# Patient Record
Sex: MALE | Race: WHITE | NOT HISPANIC OR LATINO | Employment: FULL TIME | URBAN - METROPOLITAN AREA
[De-identification: names, ages, dates, MRNs, and addresses within clinical notes are randomized per-mention and may not be internally consistent; named-entity substitution may affect disease eponyms.]

---

## 2017-04-18 ENCOUNTER — RESULTS ENCOUNTER (OUTPATIENT)
Dept: ENDOCRINOLOGY | Age: 47
End: 2017-04-18

## 2017-04-18 DIAGNOSIS — I10 ESSENTIAL HYPERTENSION: ICD-10-CM

## 2017-04-18 DIAGNOSIS — E78.2 MIXED HYPERLIPIDEMIA: ICD-10-CM

## 2017-04-18 DIAGNOSIS — IMO0001 UNCONTROLLED TYPE 2 DIABETES MELLITUS WITHOUT COMPLICATION, WITHOUT LONG-TERM CURRENT USE OF INSULIN: ICD-10-CM

## 2017-04-26 ENCOUNTER — OFFICE VISIT (OUTPATIENT)
Dept: ENDOCRINOLOGY | Age: 47
End: 2017-04-26

## 2017-04-26 VITALS
HEIGHT: 76 IN | DIASTOLIC BLOOD PRESSURE: 78 MMHG | SYSTOLIC BLOOD PRESSURE: 120 MMHG | WEIGHT: 246.8 LBS | BODY MASS INDEX: 30.05 KG/M2

## 2017-04-26 DIAGNOSIS — E34.9 TESTOSTERONE INSUFFICIENCY: ICD-10-CM

## 2017-04-26 DIAGNOSIS — IMO0001 UNCONTROLLED TYPE 2 DIABETES MELLITUS WITHOUT COMPLICATION, WITHOUT LONG-TERM CURRENT USE OF INSULIN: ICD-10-CM

## 2017-04-26 DIAGNOSIS — G62.9 PERIPHERAL POLYNEUROPATHY: ICD-10-CM

## 2017-04-26 DIAGNOSIS — E11.42 DIABETIC PERIPHERAL NEUROPATHY (HCC): ICD-10-CM

## 2017-04-26 DIAGNOSIS — E78.2 MIXED HYPERLIPIDEMIA: ICD-10-CM

## 2017-04-26 DIAGNOSIS — I10 ESSENTIAL HYPERTENSION: ICD-10-CM

## 2017-04-26 DIAGNOSIS — IMO0001 UNCONTROLLED TYPE 2 DIABETES MELLITUS WITHOUT COMPLICATION, WITHOUT LONG-TERM CURRENT USE OF INSULIN: Primary | ICD-10-CM

## 2017-04-26 PROCEDURE — 99214 OFFICE O/P EST MOD 30 MIN: CPT | Performed by: NURSE PRACTITIONER

## 2017-04-26 RX ORDER — OMEPRAZOLE 40 MG/1
CAPSULE, DELAYED RELEASE ORAL
Qty: 90 CAPSULE | Refills: 5 | Status: SHIPPED | OUTPATIENT
Start: 2017-04-26 | End: 2018-04-17 | Stop reason: SDUPTHER

## 2017-04-26 RX ORDER — PRAVASTATIN SODIUM 40 MG
40 TABLET ORAL DAILY
Qty: 90 TABLET | Refills: 5 | Status: SHIPPED | OUTPATIENT
Start: 2017-04-26 | End: 2018-04-17 | Stop reason: SDUPTHER

## 2017-04-26 RX ORDER — GLIMEPIRIDE 4 MG/1
4 TABLET ORAL
Qty: 90 TABLET | Refills: 5 | Status: SHIPPED | OUTPATIENT
Start: 2017-04-26 | End: 2018-04-17 | Stop reason: SDUPTHER

## 2017-04-26 RX ORDER — LISINOPRIL 10 MG/1
TABLET ORAL
Qty: 90 TABLET | Refills: 5 | Status: SHIPPED | OUTPATIENT
Start: 2017-04-26 | End: 2018-04-17 | Stop reason: SDUPTHER

## 2017-04-26 NOTE — PROGRESS NOTES
"Subjective   Robert Youssef is a 46 y.o. male is here today for follow-up.  Chief Complaint   Patient presents with   • Diabetes     no recent labs; didnt bring BG monitor; doesnt test BG   • Hyperlipidemia   • Hypertension   • diabetic peripheral neuropathy   • testosterone difficiency     /78  Ht 76\" (193 cm)  Wt 246 lb 12.8 oz (112 kg)  BMI 30.04 kg/m2  Current Outpatient Prescriptions on File Prior to Visit   Medication Sig   • glimepiride (AMARYL) 4 MG tablet Take 1 tablet by mouth Every Morning Before Breakfast.   • lisinopril (PRINIVIL,ZESTRIL) 10 MG tablet 1 tablet daily by mouth.   • omeprazole (PriLOSEC) 40 MG capsule TAKE ONE CAPSULE BY MOUTH DAILY   • pravastatin (PRAVACHOL) 40 MG tablet Take 1 tablet by mouth Daily.   • SITagliptin-MetFORMIN HCl ER (JANUMET XR)  MG tablet sustained-release 24 hour 1 TAB read twice a day with breakfast and supper     No current facility-administered medications on file prior to visit.      History reviewed. No pertinent family history.  Social History   Substance Use Topics   • Smoking status: Never Smoker   • Smokeless tobacco: None   • Alcohol use None         History of Present Illness  Encounter Diagnoses   Name Primary?   • Uncontrolled type 2 diabetes mellitus without complication, without long-term current use of insulin Yes   • Diabetic peripheral neuropathy    • Testosterone insufficiency    • Peripheral polyneuropathy    • Mixed hyperlipidemia    • Essential hypertension    This is a 46-year-old male patient here today for routine follow-up visit for the above-mentioned problems.  He is not had any recent labs done.  He states he is taking his medications as prescribed.  He has had no hypoglycemic events.  He is needing refills on his medications for 90 day [facility] pharmacy.  He states his energy level is good.  He was recently treated for Warner spotted fever as result of a tick bite.  He states he completed 14 days of doxycycline.  He " is not routinely checking his blood sugar levels.  I reviewed his labs from his last visit.  His hemoglobin A1c was in good range.      The following portions of the patient's history were reviewed and updated as appropriate: allergies, current medications, past family history, past medical history, past social history, past surgical history and problem list.    Review of Systems   Constitutional: Negative for fatigue.   HENT: Negative for trouble swallowing.    Eyes: Negative for visual disturbance.   Respiratory: Negative for shortness of breath.    Cardiovascular: Negative for leg swelling.   Gastrointestinal: Negative for nausea.   Endocrine: Negative for polyuria.   Genitourinary: Negative for urgency.   Musculoskeletal: Negative for joint swelling.   Skin: Negative for wound.   Allergic/Immunologic: Negative for immunocompromised state.   Neurological: Negative for numbness.   Hematological: Negative for adenopathy.   Psychiatric/Behavioral: The patient is not nervous/anxious.        Objective   Physical Exam   Constitutional: He is oriented to person, place, and time. He appears well-developed and well-nourished. No distress.   HENT:   Head: Normocephalic and atraumatic.   Right Ear: External ear normal.   Left Ear: External ear normal.   Nose: Nose normal.   Eyes: Pupils are equal, round, and reactive to light. Right eye exhibits no discharge. Left eye exhibits no discharge.   Neck: Normal range of motion. Neck supple. Carotid bruit is not present. No tracheal deviation, no edema and no erythema present. No thyromegaly present.   Cardiovascular: Normal rate, regular rhythm, normal heart sounds and intact distal pulses.  Exam reveals no gallop and no friction rub.    No murmur heard.  Pulmonary/Chest: Effort normal and breath sounds normal. No respiratory distress. He has no wheezes. He has no rales.   Abdominal: Soft. Bowel sounds are normal. He exhibits no distension. There is no tenderness.    Musculoskeletal: Normal range of motion. He exhibits no edema or deformity.   Lymphadenopathy:     He has no cervical adenopathy.   Neurological: He is alert and oriented to person, place, and time. Coordination normal.   Skin: Skin is warm and dry. No rash noted. He is not diaphoretic. No erythema. No pallor.   Psychiatric: He has a normal mood and affect. His behavior is normal. Judgment and thought content normal.   Nursing note and vitals reviewed.    Office Visit on 10/18/2016   Component Date Value Ref Range Status   • TSH 10/18/2016 2.020  0.270 - 4.200 mIU/mL Final   • T4, Total 10/18/2016 7.84  4.50 - 11.70 mcg/dL Final   • Uric Acid 10/18/2016 4.6  3.4 - 7.0 mg/dL Final   • 25 Hydroxy, Vitamin D 10/18/2016 47.3  30.0 - 100.0 ng/mL Final    Comment: Reference Range for Total Vitamin D 25(OH)  Deficiency Â  Â <20.0 ng/mL  Insufficiency 21-29 ng/mL  Sufficiency Â   ng/mL  Toxicity      >100 ng/ml     • Glucose 10/18/2016 146* 65 - 99 mg/dL Final   • BUN 10/18/2016 16  6 - 20 mg/dL Final   • Creatinine 10/18/2016 1.25  0.76 - 1.27 mg/dL Final   • eGFR Non  Am 10/18/2016 62  >60 mL/min/1.73 Final   • eGFR African Am 10/18/2016 76  >60 mL/min/1.73 Final   • BUN/Creatinine Ratio 10/18/2016 12.8  7.0 - 25.0 Final   • Sodium 10/18/2016 140  136 - 145 mmol/L Final   • Potassium 10/18/2016 4.6  3.5 - 5.2 mmol/L Final   • Chloride 10/18/2016 98  98 - 107 mmol/L Final   • Total CO2 10/18/2016 28.2  22.0 - 29.0 mmol/L Final   • Calcium 10/18/2016 9.4  8.6 - 10.5 mg/dL Final   • Total Protein 10/18/2016 7.3  6.0 - 8.5 g/dL Final   • Albumin 10/18/2016 4.90  3.50 - 5.20 g/dL Final   • Globulin 10/18/2016 2.4  gm/dL Final   • A/G Ratio 10/18/2016 2.0  g/dL Final   • Total Bilirubin 10/18/2016 0.4  0.1 - 1.2 mg/dL Final   • Alkaline Phosphatase 10/18/2016 39  39 - 117 U/L Final   • AST (SGOT) 10/18/2016 16  1 - 40 U/L Final   • ALT (SGPT) 10/18/2016 21  1 - 41 U/L Final   • C-Peptide 10/18/2016 3.7  1.1 - 4.4  ng/mL Final    C-Peptide reference interval is for fasting patients.   • Hemoglobin A1C 10/18/2016 6.80* 4.80 - 5.60 % Final    Comment: Hemoglobin A1C Ranges:  Increased Risk for Diabetes  5.7% to 6.4%  Diabetes                     >= 6.5%  Diabetic Goal                < 7.0%     • Total Cholesterol 10/18/2016 150  0 - 200 mg/dL Final   • Triglycerides 10/18/2016 138  0 - 150 mg/dL Final   • HDL Cholesterol 10/18/2016 48  40 - 60 mg/dL Final   • VLDL Cholesterol 10/18/2016 27.6  5 - 40 mg/dL Final   • LDL Cholesterol  10/18/2016 74  0 - 100 mg/dL Final         Assessment/Plan   Robert was seen today for diabetes, hyperlipidemia, hypertension, diabetic peripheral neuropathy and testosterone difficiency.    Diagnoses and all orders for this visit:    Uncontrolled type 2 diabetes mellitus without complication, without long-term current use of insulin    Diabetic peripheral neuropathy    Testosterone insufficiency    Peripheral polyneuropathy    Mixed hyperlipidemia    Essential hypertension        In summary, patient was seen and examined.  He will have extensive laboratory evaluation done at today's visit.  He'll be notified of the results along with any further recommendations.  He is to continue all his current medications as prescribed.  A prescription refills will be sent to his pharmacy for 90 days.  He is to follow-up with Dr. Goodman as scheduled or in 4 months.  BLADE Morgan was reviewed at today's visit.  Metabolically he is stable.

## 2017-04-28 LAB
25(OH)D3+25(OH)D2 SERPL-MCNC: 36.2 NG/ML (ref 30–100)
ALBUMIN SERPL-MCNC: 5.1 G/DL (ref 3.5–5.2)
ALBUMIN/GLOB SERPL: 2 G/DL
ALP SERPL-CCNC: 37 U/L (ref 39–117)
ALT SERPL-CCNC: 18 U/L (ref 1–41)
AST SERPL-CCNC: 19 U/L (ref 1–40)
BILIRUB SERPL-MCNC: 0.5 MG/DL (ref 0.1–1.2)
BUN SERPL-MCNC: 22 MG/DL (ref 6–20)
BUN/CREAT SERPL: 17.7 (ref 7–25)
C PEPTIDE SERPL-MCNC: 3.1 NG/ML (ref 1.1–4.4)
CALCIUM SERPL-MCNC: 9.9 MG/DL (ref 8.6–10.5)
CHLORIDE SERPL-SCNC: 98 MMOL/L (ref 98–107)
CHOLEST SERPL-MCNC: 152 MG/DL (ref 0–200)
CO2 SERPL-SCNC: 25.9 MMOL/L (ref 22–29)
CONV COMMENT: NORMAL
CREAT SERPL-MCNC: 1.24 MG/DL (ref 0.76–1.27)
FT4I SERPL CALC-MCNC: 1.9 (ref 1.2–4.9)
GLOBULIN SER CALC-MCNC: 2.6 GM/DL
GLUCOSE SERPL-MCNC: 105 MG/DL (ref 65–99)
HBA1C MFR BLD: 7.4 % (ref 4.8–5.6)
HCT VFR BLD AUTO: 44.4 % (ref 40.4–52.2)
HDLC SERPL-MCNC: 58 MG/DL (ref 40–60)
HGB BLD-MCNC: 14.5 G/DL (ref 13.7–17.6)
LDLC SERPL CALC-MCNC: 79 MG/DL (ref 0–100)
POTASSIUM SERPL-SCNC: 4.3 MMOL/L (ref 3.5–5.2)
PROT SERPL-MCNC: 7.7 G/DL (ref 6–8.5)
PSA SERPL-MCNC: 0.89 NG/ML (ref 0–4)
SHBG SERPL-SCNC: 28.2 NMOL/L (ref 16.5–55.9)
SODIUM SERPL-SCNC: 139 MMOL/L (ref 136–145)
T3FREE SERPL-MCNC: 4.1 PG/ML (ref 2–4.4)
T3RU NFR SERPL: 23 % (ref 24–39)
T4 FREE SERPL-MCNC: 1.15 NG/DL (ref 0.93–1.7)
T4 SERPL-MCNC: 8.2 UG/DL (ref 4.5–12)
TESTOST FREE SERPL-MCNC: 10.1 PG/ML (ref 6.8–21.5)
TESTOST SERPL-MCNC: 361 NG/DL (ref 348–1197)
TRIGL SERPL-MCNC: 74 MG/DL (ref 0–150)
TSH SERPL DL<=0.005 MIU/L-ACNC: 1.37 UIU/ML (ref 0.45–4.5)
VLDLC SERPL CALC-MCNC: 14.8 MG/DL (ref 5–40)

## 2017-07-17 RX ORDER — SITAGLIPTIN AND METFORMIN HYDROCHLORIDE 1000; 50 MG/1; MG/1
TABLET, FILM COATED, EXTENDED RELEASE ORAL
Qty: 60 TABLET | Refills: 4 | Status: SHIPPED | OUTPATIENT
Start: 2017-07-17 | End: 2018-04-17 | Stop reason: SDUPTHER

## 2017-10-16 ENCOUNTER — OFFICE VISIT (OUTPATIENT)
Dept: ENDOCRINOLOGY | Age: 47
End: 2017-10-16

## 2017-10-16 VITALS
BODY MASS INDEX: 29.07 KG/M2 | DIASTOLIC BLOOD PRESSURE: 70 MMHG | SYSTOLIC BLOOD PRESSURE: 124 MMHG | HEIGHT: 77 IN | WEIGHT: 246.2 LBS

## 2017-10-16 DIAGNOSIS — I10 ESSENTIAL HYPERTENSION: ICD-10-CM

## 2017-10-16 DIAGNOSIS — IMO0002 UNCONTROLLED TYPE 2 DIABETES MELLITUS WITH COMPLICATION, WITHOUT LONG-TERM CURRENT USE OF INSULIN: Primary | ICD-10-CM

## 2017-10-16 DIAGNOSIS — G62.9 PERIPHERAL POLYNEUROPATHY: ICD-10-CM

## 2017-10-16 DIAGNOSIS — E78.2 MIXED HYPERLIPIDEMIA: ICD-10-CM

## 2017-10-16 DIAGNOSIS — N50.819 TESTICULAR PAIN: ICD-10-CM

## 2017-10-16 PROCEDURE — 99214 OFFICE O/P EST MOD 30 MIN: CPT | Performed by: NURSE PRACTITIONER

## 2017-10-16 NOTE — PROGRESS NOTES
"Subjective   Robert Youssef is a 46 y.o. male is here today for follow-up.  Chief Complaint   Patient presents with   • Hyperlipidemia     no recent labs, BG is not testing BG and did not bring meter   • Diabetes   • Hypertension   • Hypogonadism     /70  Ht 77\" (195.6 cm)  Wt 246 lb 3.2 oz (112 kg)  BMI 29.2 kg/m2  Current Outpatient Prescriptions on File Prior to Visit   Medication Sig   • glimepiride (AMARYL) 4 MG tablet Take 1 tablet by mouth Every Morning Before Breakfast.   • JANUMET XR  MG tablet sustained-release 24 hour TAKE ONE TABLET BY MOUTH TWICE A DAY WITH BREAKFAST AND SUPPER (Patient taking differently: TAKE ONE TABLET BY MOUTH DAILY WITH SUPPER)   • lisinopril (PRINIVIL,ZESTRIL) 10 MG tablet 1 tablet daily by mouth.   • omeprazole (priLOSEC) 40 MG capsule TAKE ONE TABLET PO DAILY   • pravastatin (PRAVACHOL) 40 MG tablet Take 1 tablet by mouth Daily.   • [DISCONTINUED] hydrocortisone 2.5 % cream    • [DISCONTINUED] SITagliptin 50 MG tablet 50 mg, metFORMIN  MG tablet sustained-release 24 hour 1,000 mg Take 1 dose by mouth Daily With Breakfast.     No current facility-administered medications on file prior to visit.      History reviewed. No pertinent family history.  Social History   Substance Use Topics   • Smoking status: Never Smoker   • Smokeless tobacco: None   • Alcohol use None     Allergies   Allergen Reactions   • Tetanus Toxoid          History of Present Illness  Encounter Diagnoses   Name Primary?   • Uncontrolled type 2 diabetes mellitus with complication, without long-term current use of insulin Yes   • Mixed hyperlipidemia    • Essential hypertension    • Peripheral polyneuropathy    46-year-old male patient here today for routine follow-up visit for the above-mentioned problems.  He has not had any recent labs.  He states he has a tenderness shoulder and is going to have surgery eventually.  He also is experiencing severe testicular pain that does not seem to be " resolving.  He is not seeing a urologist.  He is not checking blood sugars.  He states the result of the testicular shoulder pain he is not sleeping very well.  He is taking all his other medications however he is only taking his Janumet once daily.      The following portions of the patient's history were reviewed and updated as appropriate: allergies, current medications, past family history, past medical history, past social history, past surgical history and problem list.    Review of Systems   Constitutional: Negative for fatigue.   HENT: Negative for trouble swallowing.    Eyes: Negative for visual disturbance.   Respiratory: Negative for shortness of breath.    Cardiovascular: Negative for leg swelling.   Endocrine: Negative for polyuria.   Skin: Negative for wound.   Neurological: Negative for numbness.       Objective   Physical Exam   Constitutional: He is oriented to person, place, and time. He appears well-developed and well-nourished. No distress.   HENT:   Head: Normocephalic and atraumatic.   Right Ear: External ear normal.   Left Ear: External ear normal.   Nose: Nose normal.   Eyes: Pupils are equal, round, and reactive to light. Right eye exhibits no discharge. Left eye exhibits no discharge.   Neck: Normal range of motion. Neck supple. Carotid bruit is not present. No tracheal deviation, no edema and no erythema present. No thyromegaly present.   Cardiovascular: Normal rate, regular rhythm, normal heart sounds and intact distal pulses.  Exam reveals no gallop and no friction rub.    No murmur heard.  Pulmonary/Chest: Effort normal and breath sounds normal. No respiratory distress. He has no wheezes. He has no rales.   Abdominal: Soft. Bowel sounds are normal. He exhibits no distension. There is no tenderness.   Musculoskeletal: Normal range of motion. He exhibits no edema or deformity.   Lymphadenopathy:     He has no cervical adenopathy.   Neurological: He is alert and oriented to person, place,  and time. Coordination normal.   Skin: Skin is warm and dry. No rash noted. He is not diaphoretic. No erythema. No pallor.   Psychiatric: He has a normal mood and affect. His behavior is normal. Judgment and thought content normal.   Nursing note and vitals reviewed.    Office Visit on 04/26/2017   Component Date Value Ref Range Status   • Glucose 04/26/2017 105* 65 - 99 mg/dL Final   • BUN 04/26/2017 22* 6 - 20 mg/dL Final   • Creatinine 04/26/2017 1.24  0.76 - 1.27 mg/dL Final   • eGFR Non African Am 04/26/2017 63  >60 mL/min/1.73 Final   • eGFR African Am 04/26/2017 76  >60 mL/min/1.73 Final   • BUN/Creatinine Ratio 04/26/2017 17.7  7.0 - 25.0 Final   • Sodium 04/26/2017 139  136 - 145 mmol/L Final   • Potassium 04/26/2017 4.3  3.5 - 5.2 mmol/L Final   • Chloride 04/26/2017 98  98 - 107 mmol/L Final   • Total CO2 04/26/2017 25.9  22.0 - 29.0 mmol/L Final   • Calcium 04/26/2017 9.9  8.6 - 10.5 mg/dL Final   • Total Protein 04/26/2017 7.7  6.0 - 8.5 g/dL Final   • Albumin 04/26/2017 5.10  3.50 - 5.20 g/dL Final   • Globulin 04/26/2017 2.6  gm/dL Final   • A/G Ratio 04/26/2017 2.0  g/dL Final   • Total Bilirubin 04/26/2017 0.5  0.1 - 1.2 mg/dL Final   • Alkaline Phosphatase 04/26/2017 37* 39 - 117 U/L Final   • AST (SGOT) 04/26/2017 19  1 - 40 U/L Final   • ALT (SGPT) 04/26/2017 18  1 - 41 U/L Final   • C-Peptide 04/26/2017 3.1  1.1 - 4.4 ng/mL Final    C-Peptide reference interval is for fasting patients.   • Hemoglobin A1C 04/26/2017 7.40* 4.80 - 5.60 % Final    Comment: Hemoglobin A1C Ranges:  Increased Risk for Diabetes  5.7% to 6.4%  Diabetes                     >= 6.5%  Diabetic Goal                < 7.0%     • Total Cholesterol 04/26/2017 152  0 - 200 mg/dL Final   • Triglycerides 04/26/2017 74  0 - 150 mg/dL Final   • HDL Cholesterol 04/26/2017 58  40 - 60 mg/dL Final   • VLDL Cholesterol 04/26/2017 14.8  5 - 40 mg/dL Final   • LDL Cholesterol  04/26/2017 79  0 - 100 mg/dL Final   • T3, Free 04/26/2017 4.1   2.0 - 4.4 pg/mL Final   • Free T4 04/26/2017 1.15  0.93 - 1.70 ng/dL Final   • TSH 04/26/2017 1.370  0.450 - 4.500 uIU/mL Final   • T4, Total 04/26/2017 8.2  4.5 - 12.0 ug/dL Final   • T3 Uptake 04/26/2017 23* 24 - 39 % Final   • Free Thyroxine Index 04/26/2017 1.9  1.2 - 4.9 Final   • 25 Hydroxy, Vitamin D 04/26/2017 36.2  30.0 - 100.0 ng/mL Final    Comment: Reference Range for Total Vitamin D 25(OH)  Deficiency    <20.0 ng/mL  Insufficiency 21-29 ng/mL  Sufficiency    ng/mL  Toxicity      >100 ng/ml        • Testosterone, Total 04/26/2017 361  348 - 1197 ng/dL Final   • Comment 04/26/2017 Comment   Final    Comment: Adult male reference interval is based on a population of lean males  up to 40 years old.     • Testosterone, Free 04/26/2017 10.1  6.8 - 21.5 pg/mL Final   • Sex Hormone Binding Globulin 04/26/2017 28.2  16.5 - 55.9 nmol/L Final   • PSA 04/26/2017 0.893  0.000 - 4.000 ng/mL Final   • Hemoglobin 04/26/2017 14.5  13.7 - 17.6 g/dL Final   • Hematocrit 04/26/2017 44.4  40.4 - 52.2 % Final         Assessment/Plan   Problems Addressed this Visit        Cardiovascular and Mediastinum    Hyperlipidemia    Essential hypertension       Endocrine    Uncontrolled type 2 diabetes mellitus - Primary       Nervous and Auditory    Peripheral neuropathy      In summary patient was examined and his last labs were reviewed.  He will extensive laboratory evaluation done at today's visit will be notified of the results along with any further recommendations.  I've encouraged him to contact the office should any questions or concerns prior to his next visit in 6 months.  He has been referred to urology for complaints of testicular pain.  He's been advised to check his blood sugars so that we can get his diabetes under better control.  His last hemoglobin A1c reflects that his diabetes is not at goal of less than 7.  Patient lives out of town only once to follow-up on a 6 month basis.

## 2017-10-17 LAB
25(OH)D3+25(OH)D2 SERPL-MCNC: 41.8 NG/ML (ref 30–100)
ALBUMIN SERPL-MCNC: 5.1 G/DL (ref 3.5–5.2)
ALBUMIN/GLOB SERPL: 2.1 G/DL
ALP SERPL-CCNC: 39 U/L (ref 39–117)
ALT SERPL-CCNC: 17 U/L (ref 1–41)
AST SERPL-CCNC: 17 U/L (ref 1–40)
BILIRUB SERPL-MCNC: 0.6 MG/DL (ref 0.1–1.2)
BUN SERPL-MCNC: 16 MG/DL (ref 6–20)
BUN/CREAT SERPL: 11.9 (ref 7–25)
C PEPTIDE SERPL-MCNC: 3.2 NG/ML (ref 1.1–4.4)
CALCIUM SERPL-MCNC: 9.8 MG/DL (ref 8.6–10.5)
CHLORIDE SERPL-SCNC: 98 MMOL/L (ref 98–107)
CHOLEST SERPL-MCNC: 153 MG/DL (ref 0–200)
CO2 SERPL-SCNC: 27.6 MMOL/L (ref 22–29)
CREAT SERPL-MCNC: 1.34 MG/DL (ref 0.76–1.27)
FT4I SERPL CALC-MCNC: 1.9 (ref 1.2–4.9)
GLOBULIN SER CALC-MCNC: 2.4 GM/DL
GLUCOSE SERPL-MCNC: 161 MG/DL (ref 65–99)
HBA1C MFR BLD: 6.93 % (ref 4.8–5.6)
HDLC SERPL-MCNC: 51 MG/DL (ref 40–60)
LDLC SERPL CALC-MCNC: 82 MG/DL (ref 0–100)
MICROALBUMIN UR-MCNC: 11.2 UG/ML
POTASSIUM SERPL-SCNC: 4.6 MMOL/L (ref 3.5–5.2)
PROT SERPL-MCNC: 7.5 G/DL (ref 6–8.5)
PSA SERPL-MCNC: 1 NG/ML (ref 0–4)
SHBG SERPL-SCNC: 26.3 NMOL/L (ref 16.5–55.9)
SODIUM SERPL-SCNC: 139 MMOL/L (ref 136–145)
T3FREE SERPL-MCNC: 3.6 PG/ML (ref 2–4.4)
T3RU NFR SERPL: 24 % (ref 24–39)
T4 FREE SERPL-MCNC: 1.13 NG/DL (ref 0.93–1.7)
T4 SERPL-MCNC: 8.1 UG/DL (ref 4.5–12)
TESTOST FREE SERPL-MCNC: 12.5 PG/ML (ref 6.8–21.5)
TESTOST SERPL-MCNC: 391 NG/DL (ref 264–916)
TRIGL SERPL-MCNC: 98 MG/DL (ref 0–150)
TSH SERPL DL<=0.005 MIU/L-ACNC: 1.62 UIU/ML (ref 0.45–4.5)
VLDLC SERPL CALC-MCNC: 19.6 MG/DL (ref 5–40)

## 2018-04-17 ENCOUNTER — OFFICE VISIT (OUTPATIENT)
Dept: ENDOCRINOLOGY | Age: 48
End: 2018-04-17

## 2018-04-17 VITALS
DIASTOLIC BLOOD PRESSURE: 72 MMHG | SYSTOLIC BLOOD PRESSURE: 110 MMHG | HEIGHT: 77 IN | BODY MASS INDEX: 29.4 KG/M2 | WEIGHT: 249 LBS

## 2018-04-17 DIAGNOSIS — E11.42 DIABETIC PERIPHERAL NEUROPATHY (HCC): ICD-10-CM

## 2018-04-17 DIAGNOSIS — E78.2 MIXED HYPERLIPIDEMIA: ICD-10-CM

## 2018-04-17 DIAGNOSIS — IMO0002 UNCONTROLLED TYPE 2 DIABETES MELLITUS WITH COMPLICATION, WITHOUT LONG-TERM CURRENT USE OF INSULIN: Primary | ICD-10-CM

## 2018-04-17 DIAGNOSIS — I10 ESSENTIAL HYPERTENSION: ICD-10-CM

## 2018-04-17 PROCEDURE — 99214 OFFICE O/P EST MOD 30 MIN: CPT | Performed by: NURSE PRACTITIONER

## 2018-04-17 RX ORDER — PRAVASTATIN SODIUM 40 MG
40 TABLET ORAL DAILY
Qty: 90 TABLET | Refills: 1 | Status: SHIPPED | OUTPATIENT
Start: 2018-04-17 | End: 2018-11-07 | Stop reason: SDUPTHER

## 2018-04-17 RX ORDER — LISINOPRIL 10 MG/1
TABLET ORAL
Qty: 90 TABLET | Refills: 1 | Status: SHIPPED | OUTPATIENT
Start: 2018-04-17 | End: 2018-10-16 | Stop reason: SDUPTHER

## 2018-04-17 RX ORDER — OMEPRAZOLE 40 MG/1
CAPSULE, DELAYED RELEASE ORAL
Qty: 90 CAPSULE | Refills: 1 | Status: SHIPPED | OUTPATIENT
Start: 2018-04-17 | End: 2018-11-07 | Stop reason: SDUPTHER

## 2018-04-17 RX ORDER — GLIMEPIRIDE 4 MG/1
4 TABLET ORAL
Qty: 90 TABLET | Refills: 1 | Status: SHIPPED | OUTPATIENT
Start: 2018-04-17 | End: 2018-04-19 | Stop reason: SDUPTHER

## 2018-04-17 NOTE — PROGRESS NOTES
"Subjective   Robert Youssef is a 47 y.o. male is here today for follow-up.  Chief Complaint   Patient presents with   • Diabetes     no recent labs, pt does not test BG   • Hyperlipidemia   • Hypertension   • Peripheral Neuropathy     /72   Ht 195.6 cm (77\")   Wt 113 kg (249 lb)   BMI 29.53 kg/m²   Current Outpatient Prescriptions on File Prior to Visit   Medication Sig   • glimepiride (AMARYL) 4 MG tablet Take 1 tablet by mouth Every Morning Before Breakfast.   • JANUMET XR  MG tablet sustained-release 24 hour TAKE ONE TABLET BY MOUTH TWICE A DAY WITH BREAKFAST AND SUPPER (Patient taking differently: TAKE ONE TABLET BY MOUTH DAILY WITH SUPPER)   • lisinopril (PRINIVIL,ZESTRIL) 10 MG tablet 1 tablet daily by mouth.   • omeprazole (priLOSEC) 40 MG capsule TAKE ONE TABLET PO DAILY   • pravastatin (PRAVACHOL) 40 MG tablet Take 1 tablet by mouth Daily.     No current facility-administered medications on file prior to visit.      History reviewed. No pertinent family history.  Social History   Substance Use Topics   • Smoking status: Never Smoker   • Smokeless tobacco: Not on file   • Alcohol use Not on file     Allergies   Allergen Reactions   • Tetanus Toxoid          History of Present Illness  Encounter Diagnoses   Name Primary?   • Essential hypertension    • Mixed hyperlipidemia    • Diabetic peripheral neuropathy    • Uncontrolled type 2 diabetes mellitus with complication, without long-term current use of insulin Yes     This is a 47-year-old male patient here today for routine follow-up visit.  He is being seen for the above-mentioned problems.  He does not currently check blood sugars.  He had labs done at his last visit which were reviewed.  He is due for laboratory evaluation today.  His medication refills will be sent to his pharmacy.  He has no complaints at today's visit.  He denies current issues with peripheral neuropathy.  The following portions of the patient's history were reviewed and " updated as appropriate: allergies, current medications, past family history, past medical history, past social history, past surgical history and problem list.    Review of Systems   Constitutional: Negative for fatigue.   HENT: Negative for trouble swallowing.    Eyes: Negative for visual disturbance.   Respiratory: Negative for shortness of breath.    Cardiovascular: Negative for leg swelling.   Endocrine: Negative for polyuria.   Skin: Negative for wound.   Neurological: Negative for numbness.       Objective   Physical Exam   Constitutional: He is oriented to person, place, and time. He appears well-developed and well-nourished. No distress.   HENT:   Head: Normocephalic and atraumatic.   Right Ear: External ear normal.   Left Ear: External ear normal.   Nose: Nose normal.   Eyes: Pupils are equal, round, and reactive to light. Right eye exhibits no discharge. Left eye exhibits no discharge.   Neck: Normal range of motion. Neck supple. Carotid bruit is not present. No tracheal deviation, no edema and no erythema present. No thyromegaly present.   Cardiovascular: Normal rate, regular rhythm, normal heart sounds and intact distal pulses.  Exam reveals no gallop and no friction rub.    No murmur heard.  Pulmonary/Chest: Effort normal and breath sounds normal. No respiratory distress. He has no wheezes. He has no rales.   Abdominal: Soft. Bowel sounds are normal. He exhibits no distension. There is no tenderness.   Musculoskeletal: Normal range of motion. He exhibits no edema or deformity.   Lymphadenopathy:     He has no cervical adenopathy.   Neurological: He is alert and oriented to person, place, and time. Coordination normal.   Skin: Skin is warm and dry. No rash noted. He is not diaphoretic. No erythema. No pallor.   Psychiatric: He has a normal mood and affect. His behavior is normal. Judgment and thought content normal.   Nursing note and vitals reviewed.    Results for orders placed or performed in visit on  10/16/17   Comprehensive Metabolic Panel   Result Value Ref Range    Glucose 161 (H) 65 - 99 mg/dL    BUN 16 6 - 20 mg/dL    Creatinine 1.34 (H) 0.76 - 1.27 mg/dL    eGFR Non African Am 57 (L) >60 mL/min/1.73    eGFR African Am 70 >60 mL/min/1.73    BUN/Creatinine Ratio 11.9 7.0 - 25.0    Sodium 139 136 - 145 mmol/L    Potassium 4.6 3.5 - 5.2 mmol/L    Chloride 98 98 - 107 mmol/L    Total CO2 27.6 22.0 - 29.0 mmol/L    Calcium 9.8 8.6 - 10.5 mg/dL    Total Protein 7.5 6.0 - 8.5 g/dL    Albumin 5.10 3.50 - 5.20 g/dL    Globulin 2.4 gm/dL    A/G Ratio 2.1 g/dL    Total Bilirubin 0.6 0.1 - 1.2 mg/dL    Alkaline Phosphatase 39 39 - 117 U/L    AST (SGOT) 17 1 - 40 U/L    ALT (SGPT) 17 1 - 41 U/L   C-Peptide   Result Value Ref Range    C-Peptide 3.2 1.1 - 4.4 ng/mL   Hemoglobin A1c   Result Value Ref Range    Hemoglobin A1C 6.93 (H) 4.80 - 5.60 %   Lipid Panel   Result Value Ref Range    Total Cholesterol 153 0 - 200 mg/dL    Triglycerides 98 0 - 150 mg/dL    HDL Cholesterol 51 40 - 60 mg/dL    VLDL Cholesterol 19.6 5 - 40 mg/dL    LDL Cholesterol  82 0 - 100 mg/dL   MicroAlbumin, Urine, Random - Urine, Clean Catch   Result Value Ref Range    Microalbumin, Urine 11.2 Not Estab. ug/mL   T3, Free   Result Value Ref Range    T3, Free 3.6 2.0 - 4.4 pg/mL   T4, Free   Result Value Ref Range    Free T4 1.13 0.93 - 1.70 ng/dL   Thyroid Panel With TSH   Result Value Ref Range    TSH 1.620 0.450 - 4.500 uIU/mL    T4, Total 8.1 4.5 - 12.0 ug/dL    T3 Uptake 24 24 - 39 %    Free Thyroxine Index 1.9 1.2 - 4.9   Vitamin D 25 Hydroxy   Result Value Ref Range    25 Hydroxy, Vitamin D 41.8 30.0 - 100.0 ng/mL   TestT+TestF+SHBG   Result Value Ref Range    Testosterone, Total 391 264 - 916 ng/dL    Testosterone, Free 12.5 6.8 - 21.5 pg/mL    Sex Hormone Binding Globulin 26.3 16.5 - 55.9 nmol/L   PSA   Result Value Ref Range    PSA 1.000 0.000 - 4.000 ng/mL         Assessment/Plan   Problems Addressed this Visit        Cardiovascular and  Mediastinum    Hyperlipidemia    Essential hypertension       Endocrine    Uncontrolled type 2 diabetes mellitus - Primary    Diabetic peripheral neuropathy      Other Visit Diagnoses    None.         In summary, patient was seen and examined.  He will continue all his current medications as prescribed.  No medications were changed at today's visit.  He will have extensive labs done and will be notified of the results along with any further recommendations.  Metabolically he is stable.  His labs were his last visit reflect that his diabetes is well-controlled.  He is currently not checking blood sugars.  He is to follow-up in 6 months have encouraged him to contact the office should you have any questions or concerns.  His medication refills were sent to his pharmacy.

## 2018-04-18 LAB
25(OH)D3+25(OH)D2 SERPL-MCNC: 36.7 NG/ML (ref 30–100)
ALBUMIN SERPL-MCNC: 4.8 G/DL (ref 3.5–5.2)
ALBUMIN/GLOB SERPL: 1.8 G/DL
ALP SERPL-CCNC: 40 U/L (ref 39–117)
ALT SERPL-CCNC: 21 U/L (ref 1–41)
AST SERPL-CCNC: 15 U/L (ref 1–40)
BILIRUB SERPL-MCNC: 0.4 MG/DL (ref 0.1–1.2)
BUN SERPL-MCNC: 17 MG/DL (ref 6–20)
BUN/CREAT SERPL: 15 (ref 7–25)
C PEPTIDE SERPL-MCNC: 3.4 NG/ML (ref 1.1–4.4)
CALCIUM SERPL-MCNC: 9.4 MG/DL (ref 8.6–10.5)
CHLORIDE SERPL-SCNC: 100 MMOL/L (ref 98–107)
CHOLEST SERPL-MCNC: 140 MG/DL (ref 0–200)
CO2 SERPL-SCNC: 26 MMOL/L (ref 22–29)
CREAT SERPL-MCNC: 1.13 MG/DL (ref 0.76–1.27)
FT4I SERPL CALC-MCNC: 1.7 (ref 1.2–4.9)
GFR SERPLBLD CREATININE-BSD FMLA CKD-EPI: 70 ML/MIN/1.73
GFR SERPLBLD CREATININE-BSD FMLA CKD-EPI: 84 ML/MIN/1.73
GLOBULIN SER CALC-MCNC: 2.7 GM/DL
GLUCOSE SERPL-MCNC: 169 MG/DL (ref 65–99)
HBA1C MFR BLD: 8.4 % (ref 4.8–5.6)
HDLC SERPL-MCNC: 46 MG/DL (ref 40–60)
INTERPRETATION: NORMAL
LDLC SERPL CALC-MCNC: 65 MG/DL (ref 0–100)
Lab: NORMAL
MICROALBUMIN UR-MCNC: 11.4 UG/ML
POTASSIUM SERPL-SCNC: 4.8 MMOL/L (ref 3.5–5.2)
PROT SERPL-MCNC: 7.5 G/DL (ref 6–8.5)
SODIUM SERPL-SCNC: 142 MMOL/L (ref 136–145)
T3FREE SERPL-MCNC: 3.3 PG/ML (ref 2–4.4)
T3RU NFR SERPL: 22 % (ref 24–39)
T4 FREE SERPL-MCNC: 1.11 NG/DL (ref 0.93–1.7)
T4 SERPL-MCNC: 7.6 UG/DL (ref 4.5–12)
TRIGL SERPL-MCNC: 144 MG/DL (ref 0–150)
TSH SERPL DL<=0.005 MIU/L-ACNC: 1.17 UIU/ML (ref 0.45–4.5)
VLDLC SERPL CALC-MCNC: 28.8 MG/DL (ref 5–40)

## 2018-04-19 RX ORDER — GLIMEPIRIDE 4 MG/1
8 TABLET ORAL
Qty: 180 TABLET | Refills: 1 | Status: SHIPPED | OUTPATIENT
Start: 2018-04-19 | End: 2019-10-30 | Stop reason: SDUPTHER

## 2018-04-20 ENCOUNTER — TELEPHONE (OUTPATIENT)
Dept: ENDOCRINOLOGY | Age: 48
End: 2018-04-20

## 2018-04-20 NOTE — TELEPHONE ENCOUNTER
----- Message from Sunita Pruitt sent at 4/19/2018  4:34 PM EDT -----  Contact: LEXY ZARAGOZA REQUEST TO REFIL / ISUESS ON MEDICATION:  MEDICATION: SITagliptin-MetFORMIN HCl ER (JANUMET XR)  MG tablet    MESSAGE:  LE[facility] HAS CALLED IN REGARDS TO GETTING CLARIFICATION ON DIRECTIONS.   WE SENT THIS SCRIPT IS STATING TAKE 1 TABLET DAILY, AND THEN SENT OVER A NEW ONE STATING TAKE 2 DAILY.     PHARMACY  IS ASKING FOR A CALL BACK     PHONE NUMBER:  [phone]    Called pharmacy and gave clarification on 4/20/2018

## 2018-04-25 ENCOUNTER — TELEPHONE (OUTPATIENT)
Dept: ENDOCRINOLOGY | Age: 48
End: 2018-04-25

## 2018-10-16 RX ORDER — LISINOPRIL 10 MG/1
TABLET ORAL
Qty: 90 TABLET | Refills: 0 | Status: SHIPPED | OUTPATIENT
Start: 2018-10-16 | End: 2019-01-11 | Stop reason: SDUPTHER

## 2018-10-18 ENCOUNTER — OFFICE VISIT (OUTPATIENT)
Dept: ENDOCRINOLOGY | Age: 48
End: 2018-10-18

## 2018-10-18 VITALS
BODY MASS INDEX: 29.28 KG/M2 | SYSTOLIC BLOOD PRESSURE: 116 MMHG | DIASTOLIC BLOOD PRESSURE: 74 MMHG | WEIGHT: 248 LBS | HEIGHT: 77 IN

## 2018-10-18 DIAGNOSIS — E11.65 UNCONTROLLED TYPE 2 DIABETES MELLITUS WITH HYPERGLYCEMIA (HCC): Primary | ICD-10-CM

## 2018-10-18 DIAGNOSIS — E11.42 DIABETIC PERIPHERAL NEUROPATHY (HCC): ICD-10-CM

## 2018-10-18 DIAGNOSIS — E78.2 MIXED HYPERLIPIDEMIA: ICD-10-CM

## 2018-10-18 DIAGNOSIS — I10 ESSENTIAL HYPERTENSION: ICD-10-CM

## 2018-10-18 DIAGNOSIS — G62.9 PERIPHERAL POLYNEUROPATHY: ICD-10-CM

## 2018-10-18 PROCEDURE — 99214 OFFICE O/P EST MOD 30 MIN: CPT | Performed by: NURSE PRACTITIONER

## 2018-10-18 NOTE — PROGRESS NOTES
"Subjective   Robert Youssef is a 47 y.o. male is here today for follow-up.  Chief Complaint   Patient presents with   • Diabetes     No recent labs, pt does not test BG    • Hyperlipidemia   • Hypertension   • Peripheral Neuropathy     /74   Ht 195.6 cm (77\")   Wt 112 kg (248 lb)   BMI 29.41 kg/m²   Current Outpatient Prescriptions on File Prior to Visit   Medication Sig   • glimepiride (AMARYL) 4 MG tablet Take 2 tablets by mouth Every Morning Before Breakfast. (Patient taking differently: Take 4 mg by mouth every night at bedtime.)   • lisinopril (PRINIVIL,ZESTRIL) 10 MG tablet TAKE ONE TABLET BY MOUTH DAILY   • omeprazole (priLOSEC) 40 MG capsule TAKE ONE TABLET PO DAILY   • pravastatin (PRAVACHOL) 40 MG tablet Take 1 tablet by mouth Daily.   • SITagliptin-MetFORMIN HCl ER (JANUMET XR)  MG tablet Take 2 tablets by mouth Daily. (Patient taking differently: Take 1 tablet by mouth every night at bedtime.)     No current facility-administered medications on file prior to visit.      No family history on file.  Social History   Substance Use Topics   • Smoking status: Never Smoker   • Smokeless tobacco: Not on file   • Alcohol use Not on file     Allergies   Allergen Reactions   • Tetanus Toxoid          History of Present Illness  Encounter Diagnoses   Name Primary?   • Essential hypertension Yes   • Mixed hyperlipidemia    • Diabetic peripheral neuropathy (CMS/HCC)    • Uncontrolled type 2 diabetes mellitus with hyperglycemia (CMS/HCC)    • Peripheral polyneuropathy    47-year-old male patient here today for routine follow-up visit.  He is being seen for the above-mentioned problems.  He is tearful at today's visit.  He states his 21-year-old daughter was killed in a car accident Memorial Day weekend.  He is having hard time coping with his loss. He has not been checking blood sugars and is not taking meds as prescribed.  His last his last visit were reviewed and show his hemoglobin A1c has gone up " significantly.  He will have labs done at today's visit.  He does not want medication for depression.  Patient was informed that he can contact the office should he change his mind    The following portions of the patient's history were reviewed and updated as appropriate: allergies, current medications, past family history, past medical history, past social history, past surgical history and problem list.    Review of Systems   Constitutional: Negative for fatigue.   HENT: Negative for trouble swallowing.    Eyes: Negative for visual disturbance.   Respiratory: Negative for shortness of breath.    Cardiovascular: Negative for leg swelling.   Endocrine: Negative for polyuria.   Skin: Negative for wound.   Neurological: Negative for numbness.       Objective   Physical Exam   Constitutional: He is oriented to person, place, and time. He appears well-developed and well-nourished. No distress.   HENT:   Head: Normocephalic and atraumatic.   Right Ear: External ear normal.   Left Ear: External ear normal.   Nose: Nose normal.   Eyes: Pupils are equal, round, and reactive to light. Right eye exhibits no discharge. Left eye exhibits no discharge.   Neck: Normal range of motion. Neck supple. Carotid bruit is not present. No tracheal deviation, no edema and no erythema present. No thyromegaly present.   Cardiovascular: Normal rate, regular rhythm, normal heart sounds and intact distal pulses.  Exam reveals no gallop and no friction rub.    No murmur heard.  Pulmonary/Chest: Effort normal and breath sounds normal. No respiratory distress. He has no wheezes. He has no rales.   Abdominal: Soft. Bowel sounds are normal. He exhibits no distension. There is no tenderness.   Musculoskeletal: Normal range of motion. He exhibits no edema or deformity.   Lymphadenopathy:     He has no cervical adenopathy.   Neurological: He is alert and oriented to person, place, and time. Coordination normal.   Skin: Skin is warm and dry. No rash  noted. He is not diaphoretic. No erythema. No pallor.   Psychiatric: He has a normal mood and affect. His behavior is normal. Judgment and thought content normal.   Nursing note and vitals reviewed.      Results for orders placed or performed in visit on 04/17/18   Comprehensive Metabolic Panel   Result Value Ref Range    Glucose 169 (H) 65 - 99 mg/dL    BUN 17 6 - 20 mg/dL    Creatinine 1.13 0.76 - 1.27 mg/dL    eGFR Non African Am 70 >60 mL/min/1.73    eGFR African Am 84 >60 mL/min/1.73    BUN/Creatinine Ratio 15.0 7.0 - 25.0    Sodium 142 136 - 145 mmol/L    Potassium 4.8 3.5 - 5.2 mmol/L    Chloride 100 98 - 107 mmol/L    Total CO2 26.0 22.0 - 29.0 mmol/L    Calcium 9.4 8.6 - 10.5 mg/dL    Total Protein 7.5 6.0 - 8.5 g/dL    Albumin 4.80 3.50 - 5.20 g/dL    Globulin 2.7 gm/dL    A/G Ratio 1.8 g/dL    Total Bilirubin 0.4 0.1 - 1.2 mg/dL    Alkaline Phosphatase 40 39 - 117 U/L    AST (SGOT) 15 1 - 40 U/L    ALT (SGPT) 21 1 - 41 U/L   C-Peptide   Result Value Ref Range    C-Peptide 3.4 1.1 - 4.4 ng/mL   Hemoglobin A1c   Result Value Ref Range    Hemoglobin A1C 8.40 (H) 4.80 - 5.60 %   Lipid Panel   Result Value Ref Range    Total Cholesterol 140 0 - 200 mg/dL    Triglycerides 144 0 - 150 mg/dL    HDL Cholesterol 46 40 - 60 mg/dL    VLDL Cholesterol 28.8 5 - 40 mg/dL    LDL Cholesterol  65 0 - 100 mg/dL   MicroAlbumin, Urine, Random - Urine, Clean Catch   Result Value Ref Range    Microalbumin, Urine 11.4 Not Estab. ug/mL   T3, Free   Result Value Ref Range    T3, Free 3.3 2.0 - 4.4 pg/mL   T4, Free   Result Value Ref Range    Free T4 1.11 0.93 - 1.70 ng/dL   Thyroid Panel With TSH   Result Value Ref Range    TSH 1.170 0.450 - 4.500 uIU/mL    T4, Total 7.6 4.5 - 12.0 ug/dL    T3 Uptake 22 (L) 24 - 39 %    Free Thyroxine Index 1.7 1.2 - 4.9   Vitamin D 25 Hydroxy   Result Value Ref Range    25 Hydroxy, Vitamin D 36.7 30.0 - 100.0 ng/ml   Cardiovascular Risk Assessment   Result Value Ref Range    Interpretation Note     Diabetes Patient Education   Result Value Ref Range    PDF Image Not applicable        Assessment/Plan   Problems Addressed this Visit        Cardiovascular and Mediastinum    Hyperlipidemia    Essential hypertension - Primary       Endocrine    Uncontrolled type 2 diabetes mellitus (CMS/HCC)    Diabetic peripheral neuropathy (CMS/HCC)       Nervous and Auditory    Peripheral neuropathy        In summary, patient was seen and examined.  His labs his last visit were reviewed and he was provided a copy.  Going through a very stressful time in his life with the loss of his daughter.  He does not want an antidepressant.  He will have extensive labs done at today's visit will be notified of the results along with any further recommendations.  He has not been taking his medications as prescribed.  He has been advised to expect medication changes if his A1c remains above goal.  He is to follow-up in 6 months

## 2018-10-20 LAB
25(OH)D3+25(OH)D2 SERPL-MCNC: 48.1 NG/ML (ref 30–100)
ALBUMIN SERPL-MCNC: 4.9 G/DL (ref 3.5–5.2)
ALBUMIN/GLOB SERPL: 1.8 G/DL
ALP SERPL-CCNC: 45 U/L (ref 39–117)
ALT SERPL-CCNC: 19 U/L (ref 1–41)
AST SERPL-CCNC: 16 U/L (ref 1–40)
BILIRUB SERPL-MCNC: 0.4 MG/DL (ref 0.1–1.2)
BUN SERPL-MCNC: 16 MG/DL (ref 6–20)
BUN/CREAT SERPL: 14.2 (ref 7–25)
C PEPTIDE SERPL-MCNC: 2.8 NG/ML (ref 1.1–4.4)
CALCIUM SERPL-MCNC: 10.1 MG/DL (ref 8.6–10.5)
CHLORIDE SERPL-SCNC: 97 MMOL/L (ref 98–107)
CHOLEST SERPL-MCNC: 148 MG/DL (ref 0–200)
CO2 SERPL-SCNC: 26.3 MMOL/L (ref 22–29)
CREAT SERPL-MCNC: 1.13 MG/DL (ref 0.76–1.27)
FT4I SERPL CALC-MCNC: 1.8 (ref 1.2–4.9)
GLOBULIN SER CALC-MCNC: 2.7 GM/DL
GLUCOSE SERPL-MCNC: 191 MG/DL (ref 65–99)
HBA1C MFR BLD: 7.67 % (ref 4.8–5.6)
HDLC SERPL-MCNC: 47 MG/DL (ref 40–60)
INTERPRETATION: NORMAL
LDLC SERPL CALC-MCNC: 64 MG/DL (ref 0–100)
Lab: NORMAL
POTASSIUM SERPL-SCNC: 4.9 MMOL/L (ref 3.5–5.2)
PROT SERPL-MCNC: 7.6 G/DL (ref 6–8.5)
SHBG SERPL-SCNC: 24.3 NMOL/L (ref 16.5–55.9)
SODIUM SERPL-SCNC: 139 MMOL/L (ref 136–145)
T3FREE SERPL-MCNC: 3.7 PG/ML (ref 2–4.4)
T3RU NFR SERPL: 23 % (ref 24–39)
T4 FREE SERPL-MCNC: 1.27 NG/DL (ref 0.93–1.7)
T4 SERPL-MCNC: 8 UG/DL (ref 4.5–12)
TESTOST FREE SERPL-MCNC: 11.4 PG/ML (ref 6.8–21.5)
TESTOST SERPL-MCNC: 306 NG/DL (ref 264–916)
TRIGL SERPL-MCNC: 183 MG/DL (ref 0–150)
TSH SERPL DL<=0.005 MIU/L-ACNC: 1.41 UIU/ML (ref 0.45–4.5)
UNABLE TO VOID: NORMAL
VLDLC SERPL CALC-MCNC: 36.6 MG/DL (ref 5–40)

## 2018-11-07 RX ORDER — OMEPRAZOLE 40 MG/1
CAPSULE, DELAYED RELEASE ORAL
Qty: 30 CAPSULE | Refills: 4 | Status: SHIPPED | OUTPATIENT
Start: 2018-11-07 | End: 2019-04-08 | Stop reason: SDUPTHER

## 2018-11-07 RX ORDER — PRAVASTATIN SODIUM 40 MG
TABLET ORAL
Qty: 30 TABLET | Refills: 4 | Status: SHIPPED | OUTPATIENT
Start: 2018-11-07 | End: 2019-04-08 | Stop reason: SDUPTHER

## 2019-01-11 RX ORDER — LISINOPRIL 10 MG/1
TABLET ORAL
Qty: 30 TABLET | Refills: 0 | Status: SHIPPED | OUTPATIENT
Start: 2019-01-11 | End: 2019-02-06 | Stop reason: SDUPTHER

## 2019-02-06 RX ORDER — LISINOPRIL 10 MG/1
TABLET ORAL
Qty: 30 TABLET | Refills: 0 | Status: SHIPPED | OUTPATIENT
Start: 2019-02-06 | End: 2019-03-04 | Stop reason: SDUPTHER

## 2019-03-04 RX ORDER — LISINOPRIL 10 MG/1
TABLET ORAL
Qty: 30 TABLET | Refills: 0 | Status: SHIPPED | OUTPATIENT
Start: 2019-03-04 | End: 2019-03-31 | Stop reason: SDUPTHER

## 2019-04-01 RX ORDER — LISINOPRIL 10 MG/1
TABLET ORAL
Qty: 30 TABLET | Refills: 0 | Status: SHIPPED | OUTPATIENT
Start: 2019-04-01 | End: 2019-05-04 | Stop reason: SDUPTHER

## 2019-04-08 RX ORDER — OMEPRAZOLE 40 MG/1
CAPSULE, DELAYED RELEASE ORAL
Qty: 30 CAPSULE | Refills: 3 | Status: SHIPPED | OUTPATIENT
Start: 2019-04-08 | End: 2019-08-11 | Stop reason: SDUPTHER

## 2019-04-08 RX ORDER — PRAVASTATIN SODIUM 40 MG
40 TABLET ORAL DAILY
Qty: 30 TABLET | Refills: 0 | Status: SHIPPED | OUTPATIENT
Start: 2019-04-08 | End: 2019-05-13 | Stop reason: SDUPTHER

## 2019-04-08 RX ORDER — PRAVASTATIN SODIUM 40 MG
TABLET ORAL
Qty: 30 TABLET | Refills: 3 | Status: CANCELLED | OUTPATIENT
Start: 2019-04-08

## 2019-04-24 RX ORDER — SITAGLIPTIN AND METFORMIN HYDROCHLORIDE 1000; 50 MG/1; MG/1
TABLET, FILM COATED, EXTENDED RELEASE ORAL
Qty: 60 TABLET | Refills: 0 | Status: SHIPPED | OUTPATIENT
Start: 2019-04-24 | End: 2019-04-26 | Stop reason: SDUPTHER

## 2019-04-26 ENCOUNTER — OFFICE VISIT (OUTPATIENT)
Dept: ENDOCRINOLOGY | Age: 49
End: 2019-04-26

## 2019-04-26 VITALS
BODY MASS INDEX: 30.2 KG/M2 | HEIGHT: 76 IN | SYSTOLIC BLOOD PRESSURE: 128 MMHG | WEIGHT: 248 LBS | DIASTOLIC BLOOD PRESSURE: 76 MMHG

## 2019-04-26 DIAGNOSIS — E11.42 DIABETIC PERIPHERAL NEUROPATHY (HCC): ICD-10-CM

## 2019-04-26 DIAGNOSIS — G62.9 PERIPHERAL POLYNEUROPATHY: ICD-10-CM

## 2019-04-26 DIAGNOSIS — E78.2 MIXED HYPERLIPIDEMIA: ICD-10-CM

## 2019-04-26 DIAGNOSIS — E11.65 UNCONTROLLED TYPE 2 DIABETES MELLITUS WITH HYPERGLYCEMIA (HCC): Primary | ICD-10-CM

## 2019-04-26 DIAGNOSIS — I10 ESSENTIAL HYPERTENSION: ICD-10-CM

## 2019-04-26 DIAGNOSIS — E55.9 VITAMIN D DEFICIENCY: ICD-10-CM

## 2019-04-26 PROCEDURE — 99214 OFFICE O/P EST MOD 30 MIN: CPT | Performed by: NURSE PRACTITIONER

## 2019-04-26 NOTE — PATIENT INSTRUCTIONS
Continue current medications, no changes  Will notify if any changes once labs reviewed from today

## 2019-04-26 NOTE — PROGRESS NOTES
"Subjective   Robert Youssef is a 48 y.o. male is here today for follow-up.  Chief Complaint   Patient presents with   • Diabetes     no recent labs, testing BG zero timesdaily, pt did not bring meter   • Hyperlipidemia     janumet is not covered by insurance   • Hypertension     /76   Ht 193 cm (76\")   Wt 112 kg (248 lb)   BMI 30.19 kg/m²   Current Outpatient Medications on File Prior to Visit   Medication Sig   • glimepiride (AMARYL) 4 MG tablet Take 2 tablets by mouth Every Morning Before Breakfast. (Patient taking differently: Take 4 mg by mouth every night at bedtime.)   • lisinopril (PRINIVIL,ZESTRIL) 10 MG tablet TAKE ONE TABLET BY MOUTH DAILY   • omeprazole (priLOSEC) 40 MG capsule TAKE ONE CAPSULE BY MOUTH DAILY   • pravastatin (PRAVACHOL) 40 MG tablet Take 1 tablet by mouth Daily.   • SITagliptin-MetFORMIN HCl ER (JANUMET XR)  MG tablet Take 2 tablets by mouth Daily. (Patient taking differently: Take 1 tablet by mouth every night at bedtime.)   • [DISCONTINUED] JANUMET XR  MG tablet TAKE TWO TABLETS BY MOUTH DAILY     No current facility-administered medications on file prior to visit.      History reviewed. No pertinent family history.  Social History     Tobacco Use   • Smoking status: Never Smoker   Substance Use Topics   • Alcohol use: Not on file   • Drug use: Not on file     Allergies   Allergen Reactions   • Tetanus Toxoid          History of Present Illness  Encounter Diagnoses   Name Primary?   • Essential hypertension    • Mixed hyperlipidemia    • Diabetic peripheral neuropathy (CMS/HCC)    • Uncontrolled type 2 diabetes mellitus with hyperglycemia (CMS/HCC) Yes   • Peripheral polyneuropathy      49 yo Patient presents today for 6 month follow up for the above list of problems. Patient denies checking blood glucose at home. Reports he does have a glucometer, but does not use. Patient denies any numbness, tingling to feet or hands. Patient denies any polyuria, changes in " bowel or bladder. Patient denies any signs or symptoms of hypo or hyperglycemia. Patient is unable to state medications he is on. States 'he takes whatever his wife gives him.' Patient had a picture of his most current list of medications. Medications were verified. Patient reports due to change in insurance, Janumet has increased in price. Patient did pay for refill and denies needing any reefills today. Patient also reports increase in stress, emotionally due to recent passing of his father. Patient has also lost his daughter within the last year. Patient has not had lab work completed prior to today's visit for review. Patient to obtain today prior to leaving.    The following portions of the patient's history were reviewed and updated as appropriate: allergies, current medications, past family history, past medical history, past social history, past surgical history and problem list.    Review of Systems   Constitutional: Negative for fatigue.   HENT: Negative for trouble swallowing.    Eyes: Negative for visual disturbance.   Cardiovascular: Negative for leg swelling.   Endocrine: Negative for polyuria.   Skin: Negative for wound.   Neurological: Negative for numbness.       Objective   Physical Exam   Constitutional: He is oriented to person, place, and time. He appears well-developed and well-nourished. No distress.   HENT:   Head: Normocephalic and atraumatic.   Right Ear: External ear normal.   Left Ear: External ear normal.   Nose: Nose normal.   Eyes: Pupils are equal, round, and reactive to light. Right eye exhibits no discharge. Left eye exhibits no discharge.   Neck: Normal range of motion. Neck supple. Carotid bruit is not present. No tracheal deviation, no edema and no erythema present. No thyromegaly present.   Cardiovascular: Normal rate, regular rhythm, normal heart sounds and intact distal pulses. Exam reveals no gallop and no friction rub.   No murmur heard.  Pulmonary/Chest: Effort normal and  breath sounds normal. No respiratory distress. He has no wheezes. He has no rales.   Abdominal: Soft. Bowel sounds are normal. He exhibits no distension. There is no tenderness.   Musculoskeletal: Normal range of motion. He exhibits no edema or deformity.   Lymphadenopathy:     He has no cervical adenopathy.   Neurological: He is alert and oriented to person, place, and time. Coordination normal.   Skin: Skin is warm and dry. No rash noted. He is not diaphoretic. No erythema. No pallor.   Psychiatric: He has a normal mood and affect. His behavior is normal. Judgment and thought content normal.   Nursing note and vitals reviewed.      Results for orders placed or performed in visit on 10/18/18   Comprehensive Metabolic Panel   Result Value Ref Range    Glucose 191 (H) 65 - 99 mg/dL    BUN 16 6 - 20 mg/dL    Creatinine 1.13 0.76 - 1.27 mg/dL    eGFR Non African Am 70 >60 mL/min/1.73    eGFR African Am 84 >60 mL/min/1.73    BUN/Creatinine Ratio 14.2 7.0 - 25.0    Sodium 139 136 - 145 mmol/L    Potassium 4.9 3.5 - 5.2 mmol/L    Chloride 97 (L) 98 - 107 mmol/L    Total CO2 26.3 22.0 - 29.0 mmol/L    Calcium 10.1 8.6 - 10.5 mg/dL    Total Protein 7.6 6.0 - 8.5 g/dL    Albumin 4.90 3.50 - 5.20 g/dL    Globulin 2.7 gm/dL    A/G Ratio 1.8 g/dL    Total Bilirubin 0.4 0.1 - 1.2 mg/dL    Alkaline Phosphatase 45 39 - 117 U/L    AST (SGOT) 16 1 - 40 U/L    ALT (SGPT) 19 1 - 41 U/L   C-Peptide   Result Value Ref Range    C-Peptide 2.8 1.1 - 4.4 ng/mL   Hemoglobin A1c   Result Value Ref Range    Hemoglobin A1C 7.67 (H) 4.80 - 5.60 %   Lipid Panel   Result Value Ref Range    Total Cholesterol 148 0 - 200 mg/dL    Triglycerides 183 (H) 0 - 150 mg/dL    HDL Cholesterol 47 40 - 60 mg/dL    VLDL Cholesterol 36.6 5 - 40 mg/dL    LDL Cholesterol  64 0 - 100 mg/dL   T3, Free   Result Value Ref Range    T3, Free 3.7 2.0 - 4.4 pg/mL   T4, Free   Result Value Ref Range    Free T4 1.27 0.93 - 1.70 ng/dL   Thyroid Panel With TSH   Result Value  Ref Range    TSH 1.410 0.450 - 4.500 uIU/mL    T4, Total 8.0 4.5 - 12.0 ug/dL    T3 Uptake 23 (L) 24 - 39 %    Free Thyroxine Index 1.8 1.2 - 4.9   Vitamin D 25 Hydroxy   Result Value Ref Range    25 Hydroxy, Vitamin D 48.1 30.0 - 100.0 ng/ml   TestT+TestF+SHBG   Result Value Ref Range    Testosterone, Total 306 264 - 916 ng/dL    Testosterone, Free 11.4 6.8 - 21.5 pg/mL    Sex Hormone Binding Globulin 24.3 16.5 - 55.9 nmol/L   Unable To Void   Result Value Ref Range    Unable to Void Comment    Cardiovascular Risk Assessment   Result Value Ref Range    Interpretation Note    Diabetes Patient Education   Result Value Ref Range    PDF Image Not applicable        Assessment/Plan   Problems Addressed this Visit        Cardiovascular and Mediastinum    Hyperlipidemia    Essential hypertension       Endocrine    Uncontrolled type 2 diabetes mellitus (CMS/HCC) - Primary    Diabetic peripheral neuropathy (CMS/HCC)       Nervous and Auditory    Peripheral neuropathy          Patient was assessed and evaluated for the above list of problems. Patient medications were reviewed and confirmed. Patient is on Lantus and Janumet. Due to high cost and change in insurance, patient was given a coupon and samples of Janumet to hold patient over until approval for medication situated. Patient did not obtain lab work prior to today's visit for review. Patient to have lab work prior to leaving today, CMP, Lipid panel, Vitamin D, C-peptide, HgbA1c, and micro urine. Once lab results completed, will review and notify patient of any changes or recommendations needed. Patient was instructed to call the office with any questions or concerns prior to next visit. Patient to follow up in 6 months.

## 2019-04-27 LAB
25(OH)D3+25(OH)D2 SERPL-MCNC: 41.9 NG/ML (ref 30–100)
ALBUMIN SERPL-MCNC: 5.2 G/DL (ref 3.5–5.2)
ALBUMIN/GLOB SERPL: 1.9 G/DL
ALP SERPL-CCNC: 48 U/L (ref 39–117)
ALT SERPL-CCNC: 31 U/L (ref 1–41)
AST SERPL-CCNC: 20 U/L (ref 1–40)
BILIRUB SERPL-MCNC: 0.3 MG/DL (ref 0.2–1.2)
BUN SERPL-MCNC: 15 MG/DL (ref 6–20)
BUN/CREAT SERPL: 13.3 (ref 7–25)
C PEPTIDE SERPL-MCNC: 3.7 NG/ML (ref 1.1–4.4)
CALCIUM SERPL-MCNC: 10.4 MG/DL (ref 8.6–10.5)
CHLORIDE SERPL-SCNC: 95 MMOL/L (ref 98–107)
CHOLEST SERPL-MCNC: 165 MG/DL (ref 0–200)
CO2 SERPL-SCNC: 26.4 MMOL/L (ref 22–29)
CREAT SERPL-MCNC: 1.13 MG/DL (ref 0.76–1.27)
GLOBULIN SER CALC-MCNC: 2.8 GM/DL
GLUCOSE SERPL-MCNC: 231 MG/DL (ref 65–99)
HBA1C MFR BLD: 9.7 % (ref 4.8–5.6)
HDLC SERPL-MCNC: 51 MG/DL (ref 40–60)
INTERPRETATION: NORMAL
LDLC SERPL CALC-MCNC: 77 MG/DL (ref 0–100)
Lab: NORMAL
MICROALBUMIN UR-MCNC: 4.6 UG/ML
POTASSIUM SERPL-SCNC: 4.8 MMOL/L (ref 3.5–5.2)
PROT SERPL-MCNC: 8 G/DL (ref 6–8.5)
SODIUM SERPL-SCNC: 136 MMOL/L (ref 136–145)
TRIGL SERPL-MCNC: 187 MG/DL (ref 0–150)
VLDLC SERPL CALC-MCNC: 37.4 MG/DL

## 2019-04-29 ENCOUNTER — TELEPHONE (OUTPATIENT)
Dept: ENDOCRINOLOGY | Age: 49
End: 2019-04-29

## 2019-05-06 RX ORDER — LISINOPRIL 10 MG/1
TABLET ORAL
Qty: 30 TABLET | Refills: 0 | Status: SHIPPED | OUTPATIENT
Start: 2019-05-06 | End: 2019-06-07 | Stop reason: SDUPTHER

## 2019-05-08 ENCOUNTER — TELEPHONE (OUTPATIENT)
Dept: ENDOCRINOLOGY | Age: 49
End: 2019-05-08

## 2019-05-08 NOTE — TELEPHONE ENCOUNTER
----- Message from SUKHWINDER Kulkarni sent at 5/7/2019  1:57 PM EDT -----  Contact: patient   or juventino is going to contact pharmacy. Please follow up with pt to see if he used coupon which should work according the   ----- Message -----  From: Lupe Mcallsiter MA  Sent: 5/7/2019  10:32 AM  To: SUKHWINDER Kulkarni     Please advise  ----- Message -----  From: Carmela Ortez  Sent: 5/7/2019  10:26 AM  To: Lupe Mcallister MA    Patient said Diamond prescribed Touluis and Yonathan called and advised it is ready but cost to patient is $386. He said  Diamond told him to let her know if insurance did not cover and  she would do something different.     Yonathan Fajardo [place] - [phone]  Pt cell [phone]

## 2019-05-13 RX ORDER — PRAVASTATIN SODIUM 40 MG
TABLET ORAL
Qty: 30 TABLET | Refills: 0 | Status: SHIPPED | OUTPATIENT
Start: 2019-05-13 | End: 2019-06-14 | Stop reason: SDUPTHER

## 2019-05-24 ENCOUNTER — TELEPHONE (OUTPATIENT)
Dept: ENDOCRINOLOGY | Age: 49
End: 2019-05-24

## 2019-05-24 NOTE — TELEPHONE ENCOUNTER
Spoke with pt and informed him of increase in dose. He expressed understanding.       ----- Message from SUKHWINDER Kulkarni sent at 5/23/2019  1:21 PM EDT -----  Contact: pt  Increase toujeo to 26 units once daily. After 3 days if morning blood sugars are greater than 110 mg/dl increase toujeo by 2 units and continue to increase until bs's are consistently less than 110 mg/dl watch closely for low bs's  ----- Message -----  From: Lupe Mcallister MA  Sent: 5/23/2019  11:25 AM  To: SUKHWINDER Kulkarni        ----- Message -----  From: Cate Heaton MA  Sent: 5/23/2019  11:09 AM  To: Lupe Mcallister MA    Pt called said he was told to call after 2 weeks on the medication changes to let Diamond know if his b/s go lower. Pt said still can't get b/s under 200, pt was asked if he is keeping a log on his b/s. Stated yes but he didn't have it and just to tell you what he said.

## 2019-06-07 RX ORDER — LISINOPRIL 10 MG/1
TABLET ORAL
Qty: 30 TABLET | Refills: 0 | Status: SHIPPED | OUTPATIENT
Start: 2019-06-07 | End: 2019-07-04 | Stop reason: SDUPTHER

## 2019-06-14 RX ORDER — PRAVASTATIN SODIUM 40 MG
TABLET ORAL
Qty: 30 TABLET | Refills: 0 | Status: SHIPPED | OUTPATIENT
Start: 2019-06-14 | End: 2019-07-13 | Stop reason: SDUPTHER

## 2019-07-05 RX ORDER — LISINOPRIL 10 MG/1
TABLET ORAL
Qty: 30 TABLET | Refills: 0 | Status: SHIPPED | OUTPATIENT
Start: 2019-07-05 | End: 2019-07-31 | Stop reason: SDUPTHER

## 2019-07-10 ENCOUNTER — TELEPHONE (OUTPATIENT)
Dept: ENDOCRINOLOGY | Age: 49
End: 2019-07-10

## 2019-07-10 NOTE — TELEPHONE ENCOUNTER
Spoke with pt and informed him of dose adjustment. I have sent an updated rx for him and he expressed understanding.     ----- Message from SUKHWINDER Kulkarni sent at 7/10/2019 12:03 PM EDT -----  Contact: PT  Increase to 50 units and continue to titrate by 2 units every 3 days  ----- Message -----  From: Lupe Mcallister MA  Sent: 7/10/2019  12:02 PM  To: SUKHWINDER Kulkarni    He is currently taking 30 units  ----- Message -----  From: Diamond Padilla APRN  Sent: 7/9/2019   9:41 PM  To: Lupe Mcallister MA    What is his current dose. He was supposed to titrate and I think I gave you back the bs log so I need it back to look at it.   ----- Message -----  From: Lupe Mcallister MA  Sent: 7/9/2019   9:09 AM  To: SUKHWINDER Kulkarni    In folder  ----- Message -----  From: Cate Heaton MA  Sent: 7/8/2019   1:12 PM  To: Lupe Mcallister MA    PT CALLED STATED HIS B/S ARE NOT GOING DOWN EVEN WITH THE DOSAGE INCREASE, PT SAID HE IS AT HIS MAX DOSAGE AND THE INSULIN IS NOT LASTING. HE ASKED EO BE CALLED BACK CONCERNING THIS MATTER. B/S LOG GIVEN

## 2019-07-15 RX ORDER — PRAVASTATIN SODIUM 40 MG
TABLET ORAL
Qty: 30 TABLET | Refills: 0 | Status: SHIPPED | OUTPATIENT
Start: 2019-07-15 | End: 2019-08-13 | Stop reason: SDUPTHER

## 2019-07-18 ENCOUNTER — TELEPHONE (OUTPATIENT)
Dept: ENDOCRINOLOGY | Age: 49
End: 2019-07-18

## 2019-07-18 RX ORDER — PIOGLITAZONEHYDROCHLORIDE 30 MG/1
30 TABLET ORAL DAILY
Qty: 30 TABLET | Refills: 5 | Status: SHIPPED | OUTPATIENT
Start: 2019-07-18 | End: 2019-07-19 | Stop reason: SDUPTHER

## 2019-07-18 NOTE — TELEPHONE ENCOUNTER
Let pt know I have changed meds to metformin and actos. Rx sent. I will need bs's if he calls back with needing meds changed. Increase lantus by 2 units every 3 days if morning bs are greater than 110 mg/dl    ----- Message from Lupe Mcallister MA sent at 7/18/2019 11:31 AM EDT -----  Pt called this morning and stated that even with his co pay card that his Janumet is 380 dollars. He is asking for something more cost effective which I will look on his formulary. Also he has been taking 50 units of toujeo and his BG is still ranging from 180-300.

## 2019-07-19 ENCOUNTER — TELEPHONE (OUTPATIENT)
Dept: ENDOCRINOLOGY | Age: 49
End: 2019-07-19

## 2019-07-19 RX ORDER — PIOGLITAZONEHYDROCHLORIDE 30 MG/1
30 TABLET ORAL DAILY
Qty: 90 TABLET | Refills: 0 | Status: SHIPPED | OUTPATIENT
Start: 2019-07-19 | End: 2019-10-30 | Stop reason: SDUPTHER

## 2019-07-19 NOTE — TELEPHONE ENCOUNTER
Spoke with pt regarding medication changes. He expressed understanding.       ----- Message from SUKHWINDER Kulkarni sent at 7/18/2019  4:35 PM EDT -----  Call pt change to  metformin 1000 mg 1 pill twice daily. Also actos 30 mg once daily. Increase lantus by 2 units every 3 days if morning blood sugars are greater than 110 mg /dl . When he calls in in the future I need to know what blood sugars are doing in order to make changes.   ----- Message -----  From: Lupe Mcallister MA  Sent: 7/18/2019  11:31 AM  To: SUKHWINDER Kulkarni    Pt called this morning and stated that even with his co pay card that his Janumet is 380 dollars. He is asking for something more cost effective which I will look on his formulary. Also he has been taking 50 units of toujeo and his BG is still ranging from 180-300.

## 2019-07-31 RX ORDER — LISINOPRIL 10 MG/1
TABLET ORAL
Qty: 30 TABLET | Refills: 0 | Status: SHIPPED | OUTPATIENT
Start: 2019-07-31 | End: 2019-08-26 | Stop reason: SDUPTHER

## 2019-08-12 RX ORDER — OMEPRAZOLE 40 MG/1
CAPSULE, DELAYED RELEASE ORAL
Qty: 30 CAPSULE | Refills: 2 | Status: SHIPPED | OUTPATIENT
Start: 2019-08-12 | End: 2019-10-30 | Stop reason: SDUPTHER

## 2019-08-13 RX ORDER — PRAVASTATIN SODIUM 40 MG
TABLET ORAL
Qty: 30 TABLET | Refills: 0 | Status: SHIPPED | OUTPATIENT
Start: 2019-08-13 | End: 2019-09-10 | Stop reason: SDUPTHER

## 2019-08-26 RX ORDER — LISINOPRIL 10 MG/1
TABLET ORAL
Qty: 30 TABLET | Refills: 0 | Status: SHIPPED | OUTPATIENT
Start: 2019-08-26 | End: 2019-09-23 | Stop reason: SDUPTHER

## 2019-09-10 RX ORDER — PRAVASTATIN SODIUM 40 MG
TABLET ORAL
Qty: 30 TABLET | Refills: 0 | Status: SHIPPED | OUTPATIENT
Start: 2019-09-10 | End: 2019-10-07 | Stop reason: SDUPTHER

## 2019-09-23 RX ORDER — LISINOPRIL 10 MG/1
TABLET ORAL
Qty: 30 TABLET | Refills: 0 | Status: SHIPPED | OUTPATIENT
Start: 2019-09-23 | End: 2019-10-21 | Stop reason: SDUPTHER

## 2019-10-07 RX ORDER — PRAVASTATIN SODIUM 40 MG
TABLET ORAL
Qty: 30 TABLET | Refills: 0 | Status: SHIPPED | OUTPATIENT
Start: 2019-10-07 | End: 2019-10-30 | Stop reason: SDUPTHER

## 2019-10-21 RX ORDER — LISINOPRIL 10 MG/1
TABLET ORAL
Qty: 30 TABLET | Refills: 0 | Status: SHIPPED | OUTPATIENT
Start: 2019-10-21 | End: 2019-10-30 | Stop reason: SDUPTHER

## 2019-10-30 ENCOUNTER — OFFICE VISIT (OUTPATIENT)
Dept: ENDOCRINOLOGY | Age: 49
End: 2019-10-30

## 2019-10-30 VITALS
SYSTOLIC BLOOD PRESSURE: 120 MMHG | BODY MASS INDEX: 30.46 KG/M2 | HEIGHT: 77 IN | DIASTOLIC BLOOD PRESSURE: 68 MMHG | WEIGHT: 258 LBS

## 2019-10-30 DIAGNOSIS — I10 ESSENTIAL HYPERTENSION: ICD-10-CM

## 2019-10-30 DIAGNOSIS — E11.65 UNCONTROLLED TYPE 2 DIABETES MELLITUS WITH HYPERGLYCEMIA (HCC): Primary | ICD-10-CM

## 2019-10-30 DIAGNOSIS — E78.2 MIXED HYPERLIPIDEMIA: ICD-10-CM

## 2019-10-30 DIAGNOSIS — G62.9 PERIPHERAL POLYNEUROPATHY: ICD-10-CM

## 2019-10-30 PROCEDURE — 99214 OFFICE O/P EST MOD 30 MIN: CPT | Performed by: NURSE PRACTITIONER

## 2019-10-30 RX ORDER — INSULIN GLARGINE 300 U/ML
INJECTION, SOLUTION SUBCUTANEOUS
Qty: 8 PEN | Refills: 4 | Status: SHIPPED | OUTPATIENT
Start: 2019-10-30 | End: 2020-01-28 | Stop reason: SDUPTHER

## 2019-10-30 RX ORDER — OMEPRAZOLE 40 MG/1
40 CAPSULE, DELAYED RELEASE ORAL DAILY
Qty: 90 CAPSULE | Refills: 3 | Status: SHIPPED | OUTPATIENT
Start: 2019-10-30 | End: 2020-05-21 | Stop reason: SDUPTHER

## 2019-10-30 RX ORDER — PRAVASTATIN SODIUM 40 MG
40 TABLET ORAL DAILY
Qty: 90 TABLET | Refills: 4 | Status: SHIPPED | OUTPATIENT
Start: 2019-10-30 | End: 2020-05-21 | Stop reason: SDUPTHER

## 2019-10-30 RX ORDER — LISINOPRIL 10 MG/1
10 TABLET ORAL DAILY
Qty: 90 TABLET | Refills: 4 | Status: SHIPPED | OUTPATIENT
Start: 2019-10-30 | End: 2020-05-21 | Stop reason: SDUPTHER

## 2019-10-30 RX ORDER — PIOGLITAZONEHYDROCHLORIDE 30 MG/1
30 TABLET ORAL DAILY
Qty: 90 TABLET | Refills: 4 | Status: SHIPPED | OUTPATIENT
Start: 2019-10-30 | End: 2020-05-21 | Stop reason: SDUPTHER

## 2019-10-30 RX ORDER — GLIMEPIRIDE 4 MG/1
8 TABLET ORAL
Qty: 180 TABLET | Refills: 4 | Status: SHIPPED | OUTPATIENT
Start: 2019-10-30 | End: 2020-05-22 | Stop reason: SDUPTHER

## 2019-10-30 NOTE — PATIENT INSTRUCTIONS
Continue all medications pending labs   Be consistent with insulin dose  We will make changes based on labs  Bring meter each visit

## 2019-10-30 NOTE — PROGRESS NOTES
"Subjective   Robert Youssef is a 49 y.o. male is here today for follow-up.  Chief Complaint   Patient presents with   • Diabetes     no recent labs  test BG once daily did not bring meter   • Hypertension   • Hyperlipidemia     /68   Ht 195.6 cm (77\")   Wt 117 kg (258 lb)   BMI 30.59 kg/m²   Current Outpatient Medications on File Prior to Visit   Medication Sig   • glimepiride (AMARYL) 4 MG tablet Take 2 tablets by mouth Every Morning Before Breakfast. (Patient taking differently: Take 4 mg by mouth every night at bedtime.)   • glucose blood test strip Use to test BG 1 time daily. [employer] contour test strips   • Insulin Glargine 300 UNIT/ML solution pen-injector Inject 50 Units under the skin into the appropriate area as directed Daily.   • Insulin Pen Needle (BD PEN NEEDLE GHANSHYAM U/F) 32G X 4 MM misc Use to inject insulin once daily   • lisinopril (PRINIVIL,ZESTRIL) 10 MG tablet TAKE ONE TABLET BY MOUTH DAILY   • metFORMIN (GLUCOPHAGE) 1000 MG tablet Take 1 tablet by mouth 2 (Two) Times a Day With Meals.   • omeprazole (priLOSEC) 40 MG capsule TAKE ONE CAPSULE BY MOUTH DAILY   • pravastatin (PRAVACHOL) 40 MG tablet TAKE ONE TABLET BY MOUTH DAILY   • Linagliptin-metFORMIN HCl ER (JENTADUETO XR) 2.5-1000 MG tablet sustained-release 24 hour Take 2 tablets by mouth Daily.   • pioglitazone (ACTOS) 30 MG tablet Take 1 tablet by mouth Daily.     No current facility-administered medications on file prior to visit.      History reviewed. No pertinent family history.  Social History     Tobacco Use   • Smoking status: Never Smoker   • Smokeless tobacco: Never Used   Substance Use Topics   • Alcohol use: Not on file   • Drug use: Not on file     Allergies   Allergen Reactions   • Tetanus Toxoid          History of Present Illness   Encounter Diagnoses   Name Primary?   • Peripheral polyneuropathy    • Uncontrolled type 2 diabetes mellitus with hyperglycemia (CMS/HCC) Yes   • Mixed hyperlipidemia    • Essential " hypertension      This is a 49-year-old male patient here today for follow-up visit.  He is being seen for the above-mentioned problems.  He does have complaints of headaches however he does not want a follow-up with neurology or with his primary care provider.  He is not sure of the medications he currently takes.  His wife gives him his medications.  He is sometimes taking 50 units of insulin daily and sometimes he is decreasing it to 25 units of insulin.  He is running out of his medications before he can get refills.  He is requesting for 90-day refills on his meds.  He has a history of peripheral neuropathy but does not want treatment.  He is currently retired from being a professional fissure.  He is working on restoring an old [place].  He is depressed following the unexpected death of his daughter and his father.  He does not want to see any further providers and he does not want any more medications.  He has not had recent labs and will have labs done at today's visit.  His labs from his last visit were reviewed and his A1c reflects uncontrolled diabetes.  He denies any hypoglycemic events.  He will check his blood sugars on occasion however he did not bring his blood glucose meter to today's visit  The following portions of the patient's history were reviewed and updated as appropriate: allergies, current medications, past family history, past medical history, past social history, past surgical history and problem list.    Review of Systems   Constitutional: Positive for fatigue. Negative for appetite change and fever.   Eyes: Negative for visual disturbance.   Cardiovascular: Negative for palpitations and leg swelling.   Gastrointestinal: Negative for abdominal pain and vomiting.   Endocrine: Negative for polydipsia and polyuria.   Musculoskeletal: Negative for joint swelling and neck pain.   Skin: Negative for rash.   Neurological: Negative for weakness and numbness.   Psychiatric/Behavioral: Negative  for behavioral problems.       Objective   Physical Exam   Constitutional: He is oriented to person, place, and time. He appears well-developed and well-nourished. No distress.   HENT:   Head: Normocephalic and atraumatic.   Right Ear: External ear normal.   Left Ear: External ear normal.   Nose: Nose normal.   Eyes: Pupils are equal, round, and reactive to light. Right eye exhibits no discharge. Left eye exhibits no discharge.   Neck: Normal range of motion. Neck supple. Carotid bruit is not present. No tracheal deviation, no edema and no erythema present. No thyromegaly present.   Cardiovascular: Normal rate, regular rhythm, normal heart sounds and intact distal pulses. Exam reveals no gallop and no friction rub.   No murmur heard.  Pulmonary/Chest: Effort normal and breath sounds normal. No respiratory distress. He has no wheezes. He has no rales.   Abdominal: Soft. Bowel sounds are normal. He exhibits no distension. There is no tenderness.   Musculoskeletal: Normal range of motion. He exhibits no edema or deformity.   Lymphadenopathy:     He has no cervical adenopathy.   Neurological: He is alert and oriented to person, place, and time. Coordination normal.   Skin: Skin is warm and dry. No rash noted. He is not diaphoretic. No erythema. No pallor.   Psychiatric: He has a normal mood and affect. His behavior is normal. Judgment and thought content normal.   Nursing note and vitals reviewed.    Results for orders placed or performed in visit on 04/26/19   Comprehensive Metabolic Panel   Result Value Ref Range    Glucose 231 (H) 65 - 99 mg/dL    BUN 15 6 - 20 mg/dL    Creatinine 1.13 0.76 - 1.27 mg/dL    eGFR Non African Am 69 >60 mL/min/1.73    eGFR African Am 84 >60 mL/min/1.73    BUN/Creatinine Ratio 13.3 7.0 - 25.0    Sodium 136 136 - 145 mmol/L    Potassium 4.8 3.5 - 5.2 mmol/L    Chloride 95 (L) 98 - 107 mmol/L    Total CO2 26.4 22.0 - 29.0 mmol/L    Calcium 10.4 8.6 - 10.5 mg/dL    Total Protein 8.0 6.0 -  8.5 g/dL    Albumin 5.20 3.50 - 5.20 g/dL    Globulin 2.8 gm/dL    A/G Ratio 1.9 g/dL    Total Bilirubin 0.3 0.2 - 1.2 mg/dL    Alkaline Phosphatase 48 39 - 117 U/L    AST (SGOT) 20 1 - 40 U/L    ALT (SGPT) 31 1 - 41 U/L   C-Peptide   Result Value Ref Range    C-Peptide 3.7 1.1 - 4.4 ng/mL   Hemoglobin A1c   Result Value Ref Range    Hemoglobin A1C 9.70 (H) 4.80 - 5.60 %   Lipid Panel   Result Value Ref Range    Total Cholesterol 165 0 - 200 mg/dL    Triglycerides 187 (H) 0 - 150 mg/dL    HDL Cholesterol 51 40 - 60 mg/dL    VLDL Cholesterol 37.4 mg/dL    LDL Cholesterol  77 0 - 100 mg/dL   MicroAlbumin, Urine, Random - Urine, Clean Catch   Result Value Ref Range    Microalbumin, Urine 4.6 Not Estab. ug/mL   Vitamin D 25 Hydroxy   Result Value Ref Range    25 Hydroxy, Vitamin D 41.9 30.0 - 100.0 ng/ml   Cardiovascular Risk Assessment   Result Value Ref Range    Interpretation Note    Diabetes Patient Education   Result Value Ref Range    PDF Image Not applicable          Assessment/Plan   Problems Addressed this Visit        Cardiovascular and Mediastinum    Hyperlipidemia    Relevant Orders    Testosterone (Free & Total), LC / MS    Comprehensive Metabolic Panel    C-Peptide    Hemoglobin A1c    Lipid Panel    MicroAlbumin, Urine, Random - Urine, Clean Catch    T3, Free    T4, Free    Thyroid Panel With TSH    Vitamin D 25 Hydroxy    PSA DIAGNOSTIC    FSH & LH    Essential hypertension    Relevant Orders    Testosterone (Free & Total), LC / MS    Comprehensive Metabolic Panel    C-Peptide    Hemoglobin A1c    Lipid Panel    MicroAlbumin, Urine, Random - Urine, Clean Catch    T3, Free    T4, Free    Thyroid Panel With TSH    Vitamin D 25 Hydroxy    PSA DIAGNOSTIC    FSH & LH       Endocrine    Uncontrolled type 2 diabetes mellitus (CMS/[facility]) - Primary    Relevant Orders    Testosterone (Free & Total), LC / MS    Comprehensive Metabolic Panel    C-Peptide    Hemoglobin A1c    Lipid Panel    MicroAlbumin, Urine, Random  - Urine, Clean Catch    T3, Free    T4, Free    Thyroid Panel With TSH    Vitamin D 25 Hydroxy    PSA DIAGNOSTIC    FSH & LH       Nervous and Auditory    Peripheral neuropathy    Relevant Orders    Testosterone (Free & Total), LC / MS    Comprehensive Metabolic Panel    C-Peptide    Hemoglobin A1c    Lipid Panel    MicroAlbumin, Urine, Random - Urine, Clean Catch    T3, Free    T4, Free    Thyroid Panel With TSH    Vitamin D 25 Hydroxy    PSA DIAGNOSTIC    FSH & LH          Patient was seen and examined.  Clinically and metabolically he is stable.  He will follow-up in 6 months with labs same day.  He will have extensive labs and at today's visit will be notified of the results along with any further recommendations.  He has been prescribed Janumet in the past however he was not able to get it filled or the coupon to reduce his co-pay cost.  We discussed being consistent with the amount of insulin he takes on a daily basis.  If his A1c is elevated we can adjust his insulin and we discussed how to titrate by 2 units every 3 days to target his morning blood sugars to less than 110 mg/dL.  His peripheral neuropathy is stable.  He is not interested and being referred for evaluation of his headache that he states happens several times weekly.  He denies any hypoglycemic events.  He is to follow-up in 6 months.  I have encouraged him to reach out to the office should he have any questions or concerns prior to his next visit

## 2019-11-02 LAB
25(OH)D3+25(OH)D2 SERPL-MCNC: 39.8 NG/ML (ref 30–100)
ALBUMIN SERPL-MCNC: 4.8 G/DL (ref 3.5–5.2)
ALBUMIN/GLOB SERPL: 2.2 G/DL
ALP SERPL-CCNC: 38 U/L (ref 39–117)
ALT SERPL-CCNC: 18 U/L (ref 1–41)
AST SERPL-CCNC: 16 U/L (ref 1–40)
BILIRUB SERPL-MCNC: 0.2 MG/DL (ref 0.2–1.2)
BUN SERPL-MCNC: 11 MG/DL (ref 6–20)
BUN/CREAT SERPL: 9.2 (ref 7–25)
C PEPTIDE SERPL-MCNC: 2.9 NG/ML (ref 1.1–4.4)
CALCIUM SERPL-MCNC: 9.1 MG/DL (ref 8.6–10.5)
CHLORIDE SERPL-SCNC: 97 MMOL/L (ref 98–107)
CHOLEST SERPL-MCNC: 159 MG/DL (ref 0–200)
CO2 SERPL-SCNC: 27 MMOL/L (ref 22–29)
CREAT SERPL-MCNC: 1.19 MG/DL (ref 0.76–1.27)
FSH SERPL-ACNC: 5 MIU/ML (ref 1.5–12.4)
FT4I SERPL CALC-MCNC: 2.1 (ref 1.2–4.9)
GLOBULIN SER CALC-MCNC: 2.2 GM/DL
GLUCOSE SERPL-MCNC: 212 MG/DL (ref 65–99)
HBA1C MFR BLD: 8.2 % (ref 4.8–5.6)
HDLC SERPL-MCNC: 47 MG/DL (ref 40–60)
INTERPRETATION: NORMAL
LDLC SERPL CALC-MCNC: 82 MG/DL (ref 0–100)
LH SERPL-ACNC: 5.1 MIU/ML (ref 1.7–8.6)
Lab: NORMAL
POTASSIUM SERPL-SCNC: 4.8 MMOL/L (ref 3.5–5.2)
PROT SERPL-MCNC: 7 G/DL (ref 6–8.5)
PSA SERPL-MCNC: 0.96 NG/ML (ref 0–4)
SODIUM SERPL-SCNC: 138 MMOL/L (ref 136–145)
T3FREE SERPL-MCNC: 3.2 PG/ML (ref 2–4.4)
T3RU NFR SERPL: 24 % (ref 24–39)
T4 FREE SERPL-MCNC: 1.1 NG/DL (ref 0.93–1.7)
T4 SERPL-MCNC: 8.6 UG/DL (ref 4.5–12)
TESTOST FREE SERPL-MCNC: 9.9 PG/ML (ref 6.8–21.5)
TESTOST SERPL-MCNC: 405 NG/DL (ref 264–916)
TRIGL SERPL-MCNC: 148 MG/DL (ref 0–150)
TSH SERPL DL<=0.005 MIU/L-ACNC: 1.81 UIU/ML (ref 0.45–4.5)
UNABLE TO VOID: NORMAL
VLDLC SERPL CALC-MCNC: 29.6 MG/DL

## 2020-01-28 ENCOUNTER — DOCUMENTATION (OUTPATIENT)
Dept: ENDOCRINOLOGY | Age: 50
End: 2020-01-28

## 2020-01-28 RX ORDER — INSULIN GLARGINE 300 U/ML
80 INJECTION, SOLUTION SUBCUTANEOUS DAILY
Qty: 24 ML | Refills: 1 | Status: SHIPPED | OUTPATIENT
Start: 2020-01-28 | End: 2020-01-29 | Stop reason: CLARIF

## 2020-01-28 NOTE — PROGRESS NOTES
01-28-20    On Tuesday 1-28-20 pt calmed that he had been trying to contact Endo because he was out of his Rx. Upon contacting the mr suh he stated that he had L\M on ALL of the Providers V\M but still had no reply. After apologizing for not returning his call in a timely matter I informed Mr. Suh that his Rx has been refilled.

## 2020-05-21 ENCOUNTER — OFFICE VISIT (OUTPATIENT)
Dept: ENDOCRINOLOGY | Age: 50
End: 2020-05-21

## 2020-05-21 DIAGNOSIS — G62.9 PERIPHERAL POLYNEUROPATHY: ICD-10-CM

## 2020-05-21 DIAGNOSIS — E11.65 UNCONTROLLED TYPE 2 DIABETES MELLITUS WITH HYPERGLYCEMIA (HCC): Primary | ICD-10-CM

## 2020-05-21 DIAGNOSIS — E11.42 DIABETIC PERIPHERAL NEUROPATHY (HCC): ICD-10-CM

## 2020-05-21 DIAGNOSIS — I10 ESSENTIAL HYPERTENSION: ICD-10-CM

## 2020-05-21 DIAGNOSIS — E78.2 MIXED HYPERLIPIDEMIA: ICD-10-CM

## 2020-05-21 PROCEDURE — 99214 OFFICE O/P EST MOD 30 MIN: CPT | Performed by: NURSE PRACTITIONER

## 2020-05-21 RX ORDER — PRAVASTATIN SODIUM 40 MG
40 TABLET ORAL DAILY
Qty: 90 TABLET | Refills: 4 | Status: SHIPPED | OUTPATIENT
Start: 2020-05-21 | End: 2020-05-22 | Stop reason: SDUPTHER

## 2020-05-21 RX ORDER — LISINOPRIL 10 MG/1
10 TABLET ORAL DAILY
Qty: 90 TABLET | Refills: 4 | Status: SHIPPED | OUTPATIENT
Start: 2020-05-21 | End: 2020-05-22 | Stop reason: SDUPTHER

## 2020-05-21 RX ORDER — PIOGLITAZONEHYDROCHLORIDE 30 MG/1
30 TABLET ORAL DAILY
Qty: 90 TABLET | Refills: 4 | Status: SHIPPED | OUTPATIENT
Start: 2020-05-21 | End: 2020-05-22 | Stop reason: SDUPTHER

## 2020-05-21 RX ORDER — OMEPRAZOLE 40 MG/1
40 CAPSULE, DELAYED RELEASE ORAL DAILY
Qty: 90 CAPSULE | Refills: 3 | Status: SHIPPED | OUTPATIENT
Start: 2020-05-21 | End: 2020-05-22 | Stop reason: SDUPTHER

## 2020-05-21 NOTE — PROGRESS NOTES
Subjective   Robert Youssef is a 49 y.o. male is here today for follow-up.  Chief Complaint   Patient presents with   • Diabetes   • Hyperlipidemia     There were no vitals taken for this visit.  Current Outpatient Medications on File Prior to Visit   Medication Sig   • glimepiride (AMARYL) 4 MG tablet Take 2 tablets by mouth Every Morning Before Breakfast.   • [DISCONTINUED] glucose blood test strip Use to test BG 1 time daily. [employer] contour test strips   • [DISCONTINUED] Insulin Glargine, 1 Unit Dial, (TOUJEO SOLOSTAR) 300 UNIT/ML solution pen-injector injection Inject up to 80 units daily   • [DISCONTINUED] Insulin Pen Needle (BD PEN NEEDLE GHANSHYAM U/F) 32G X 4 MM misc Use to inject insulin once daily   • [DISCONTINUED] lisinopril (PRINIVIL,ZESTRIL) 10 MG tablet Take 1 tablet by mouth Daily.   • [DISCONTINUED] metFORMIN (GLUCOPHAGE) 1000 MG tablet Take 1 tablet by mouth 2 (Two) Times a Day With Meals.   • [DISCONTINUED] omeprazole (priLOSEC) 40 MG capsule Take 1 capsule by mouth Daily.   • [DISCONTINUED] pioglitazone (ACTOS) 30 MG tablet Take 1 tablet by mouth Daily.   • [DISCONTINUED] pravastatin (PRAVACHOL) 40 MG tablet Take 1 tablet by mouth Daily.     No current facility-administered medications on file prior to visit.      History reviewed. No pertinent family history.  Social History     Tobacco Use   • Smoking status: Never Smoker   • Smokeless tobacco: Never Used   Substance Use Topics   • Alcohol use: Not on file   • Drug use: Not on file     Allergies   Allergen Reactions   • Tetanus Toxoid      You have chosen to receive care through a telehealth visit.  Do you consent to use a video/audio connection for your medical care today? Yes  Total time 24 min      History of Present Illness  Encounter Diagnoses   Name Primary?   • Diabetic peripheral neuropathy (CMS/HCC)    • Essential hypertension    • Mixed hyperlipidemia    • Peripheral polyneuropathy    • Uncontrolled type 2 diabetes mellitus with  hyperglycemia (CMS/HCC) Yes     49-year-old male patient evaluated today via video for the above diagnoses.  He has not had recent labs done.  He will be mailed lab orders to have done at his convenience closer to home.  Requesting written prescriptions for his medications.  His medication list was reviewed and updated.  He denies any physical changes and states his weight is staying the same.  During the pandemic he was working some from home and he states when he was working from home his blood sugars were staying in the 1 10-1 20 range.  Since going back to work due to the stress his blood sugars have been back up in the 200 range.  He adjust his insulin based on his morning blood sugars.  He denies any hypoglycemic events.  He did have tingling in his left lower extremity however he states it is since gone away.  He denies any questions or concerns currently.  The following portions of the patient's history were reviewed and updated as appropriate: allergies, current medications, past family history, past medical history, past social history, past surgical history and problem list.    Review of Systems   Constitutional: Negative for appetite change, fatigue and fever.   Eyes: Negative for visual disturbance.   Respiratory: Negative for cough and shortness of breath.    Cardiovascular: Negative for chest pain, palpitations and leg swelling.   Gastrointestinal: Negative for abdominal pain, constipation, diarrhea and vomiting.   Endocrine: Negative for cold intolerance, heat intolerance, polydipsia and polyuria.   Musculoskeletal: Negative for joint swelling and neck pain.   Skin: Negative for rash.   Neurological: Negative for weakness and numbness.   Psychiatric/Behavioral: Negative for behavioral problems.       Objective   Physical Exam   Constitutional: He is oriented to person, place, and time. He appears well-developed and well-nourished. No distress.   HENT:   Head: Normocephalic.   Nose: Nose normal.    Eyes: Pupils are equal, round, and reactive to light. Conjunctivae are normal.   Neck: Normal range of motion.   Cardiovascular: Normal rate and regular rhythm.   Pulmonary/Chest: Effort normal.   Musculoskeletal: Normal range of motion.   Neurological: He is alert and oriented to person, place, and time.   Skin: Skin is warm and dry.   Psychiatric: He has a normal mood and affect. His behavior is normal. Judgment and thought content normal.     Results for orders placed or performed in visit on 10/30/19   Testosterone (Free & Total), LC / MS   Result Value Ref Range    Testosterone, Total 405.0 264.0 - 916.0 ng/dL    Testosterone, Free 9.9 6.8 - 21.5 pg/mL   Comprehensive Metabolic Panel   Result Value Ref Range    Glucose 212 (H) 65 - 99 mg/dL    BUN 11 6 - 20 mg/dL    Creatinine 1.19 0.76 - 1.27 mg/dL    eGFR Non African Am 65 >60 mL/min/1.73    eGFR African Am 79 >60 mL/min/1.73    BUN/Creatinine Ratio 9.2 7.0 - 25.0    Sodium 138 136 - 145 mmol/L    Potassium 4.8 3.5 - 5.2 mmol/L    Chloride 97 (L) 98 - 107 mmol/L    Total CO2 27.0 22.0 - 29.0 mmol/L    Calcium 9.1 8.6 - 10.5 mg/dL    Total Protein 7.0 6.0 - 8.5 g/dL    Albumin 4.80 3.50 - 5.20 g/dL    Globulin 2.2 gm/dL    A/G Ratio 2.2 g/dL    Total Bilirubin 0.2 0.2 - 1.2 mg/dL    Alkaline Phosphatase 38 (L) 39 - 117 U/L    AST (SGOT) 16 1 - 40 U/L    ALT (SGPT) 18 1 - 41 U/L   C-Peptide   Result Value Ref Range    C-Peptide 2.9 1.1 - 4.4 ng/mL   Hemoglobin A1c   Result Value Ref Range    Hemoglobin A1C 8.20 (H) 4.80 - 5.60 %   Lipid Panel   Result Value Ref Range    Total Cholesterol 159 0 - 200 mg/dL    Triglycerides 148 0 - 150 mg/dL    HDL Cholesterol 47 40 - 60 mg/dL    VLDL Cholesterol 29.6 mg/dL    LDL Cholesterol  82 0 - 100 mg/dL   T3, Free   Result Value Ref Range    T3, Free 3.2 2.0 - 4.4 pg/mL   T4, Free   Result Value Ref Range    Free T4 1.10 0.93 - 1.70 ng/dL   Thyroid Panel With TSH   Result Value Ref Range    TSH 1.810 0.450 - 4.500 uIU/mL     T4, Total 8.6 4.5 - 12.0 ug/dL    T3 Uptake 24 24 - 39 %    Free Thyroxine Index 2.1 1.2 - 4.9   Vitamin D 25 Hydroxy   Result Value Ref Range    25 Hydroxy, Vitamin D 39.8 30.0 - 100.0 ng/ml   PSA DIAGNOSTIC   Result Value Ref Range    PSA 0.956 0.000 - 4.000 ng/mL   FSH & LH   Result Value Ref Range    LH 5.1 1.7 - 8.6 mIU/mL    FSH 5.0 1.5 - 12.4 mIU/mL   Unable To Void   Result Value Ref Range    Unable to Void Comment    Cardiovascular Risk Assessment   Result Value Ref Range    Interpretation Note    Diabetes Patient Education   Result Value Ref Range    PDF Image Not applicable          Assessment/Plan   Problems Addressed this Visit        Cardiovascular and Mediastinum    Hyperlipidemia    Relevant Medications    pravastatin (PRAVACHOL) 40 MG tablet    Other Relevant Orders    Comprehensive Metabolic Panel    C-Peptide    Hemoglobin A1c    Lipid Panel    MicroAlbumin, Urine, Random - Urine, Clean Catch    T3, Free    T4, Free    Thyroid Panel With TSH    Vitamin D 25 Hydroxy    Essential hypertension    Relevant Medications    lisinopril (PRINIVIL,ZESTRIL) 10 MG tablet    Other Relevant Orders    Comprehensive Metabolic Panel    C-Peptide    Hemoglobin A1c    Lipid Panel    MicroAlbumin, Urine, Random - Urine, Clean Catch    T3, Free    T4, Free    Thyroid Panel With TSH    Vitamin D 25 Hydroxy       Endocrine    Uncontrolled type 2 diabetes mellitus (CMS/HCC) - Primary    Relevant Medications    pioglitazone (Actos) 30 MG tablet    metFORMIN (Glucophage) 1000 MG tablet    Insulin Glargine, 1 Unit Dial, ([employer]o SoloStar) 300 UNIT/ML solution pen-injector injection    Other Relevant Orders    Comprehensive Metabolic Panel    C-Peptide    Hemoglobin A1c    Lipid Panel    MicroAlbumin, Urine, Random - Urine, Clean Catch    T3, Free    T4, Free    Thyroid Panel With TSH    Vitamin D 25 Hydroxy       Nervous and Auditory    Peripheral neuropathy    Relevant Orders    Comprehensive Metabolic Panel    C-Peptide     Hemoglobin A1c    Lipid Panel    MicroAlbumin, Urine, Random - Urine, Clean Catch    T3, Free    T4, Free    Thyroid Panel With TSH    Vitamin D 25 Hydroxy    Diabetic peripheral neuropathy (CMS/HCC)    Relevant Orders    Comprehensive Metabolic Panel    C-Peptide    Hemoglobin A1c    Lipid Panel    MicroAlbumin, Urine, Random - Urine, Clean Catch    T3, Free    T4, Free    Thyroid Panel With TSH    Vitamin D 25 Hydroxy        Patient was evaluated via video.  Metabolically and clinically he presents stable.  He will have extensive labs done externally.  His prescriptions have been printed and mailed to him.  He will continue all his current medications as prescribed.  He will follow-up in 6 to 8 months with labs prior.  He has been encouraged to contact the office should he have any questions or concerns.  He denies any weight changes.  His neuropathy has since improved.  He denies any significant hypoglycemic events.  His previous labs were reviewed.  His last A1c was 8.2.  According to patient stress is impacting his blood sugars.  He is observing social distance guidelines during the pandemic.

## 2020-05-21 NOTE — PATIENT INSTRUCTIONS
Labs to be done externally  Follow-up in the office in 6 or 7 months  Contact the office if you have any questions or concerns

## 2020-05-22 RX ORDER — GLIMEPIRIDE 4 MG/1
8 TABLET ORAL
Qty: 180 TABLET | Refills: 4 | Status: SHIPPED | OUTPATIENT
Start: 2020-05-22

## 2020-05-22 RX ORDER — OMEPRAZOLE 40 MG/1
40 CAPSULE, DELAYED RELEASE ORAL DAILY
Qty: 90 CAPSULE | Refills: 3 | Status: SHIPPED | OUTPATIENT
Start: 2020-05-22 | End: 2020-11-04

## 2020-05-22 RX ORDER — PRAVASTATIN SODIUM 40 MG
40 TABLET ORAL DAILY
Qty: 90 TABLET | Refills: 4 | Status: SHIPPED | OUTPATIENT
Start: 2020-05-22

## 2020-05-22 RX ORDER — PIOGLITAZONEHYDROCHLORIDE 30 MG/1
30 TABLET ORAL DAILY
Qty: 90 TABLET | Refills: 4 | Status: SHIPPED | OUTPATIENT
Start: 2020-05-22 | End: 2021-05-22

## 2020-05-22 RX ORDER — LISINOPRIL 10 MG/1
10 TABLET ORAL DAILY
Qty: 90 TABLET | Refills: 4 | Status: SHIPPED | OUTPATIENT
Start: 2020-05-22

## 2020-05-27 ENCOUNTER — TELEPHONE (OUTPATIENT)
Dept: ENDOCRINOLOGY | Age: 50
End: 2020-05-27

## 2020-07-21 ENCOUNTER — RESULTS ENCOUNTER (OUTPATIENT)
Dept: ENDOCRINOLOGY | Age: 50
End: 2020-07-21

## 2020-07-21 DIAGNOSIS — I10 ESSENTIAL HYPERTENSION: ICD-10-CM

## 2020-07-21 DIAGNOSIS — E78.2 MIXED HYPERLIPIDEMIA: ICD-10-CM

## 2020-07-21 DIAGNOSIS — E11.65 UNCONTROLLED TYPE 2 DIABETES MELLITUS WITH HYPERGLYCEMIA (HCC): ICD-10-CM

## 2020-07-21 DIAGNOSIS — E11.42 DIABETIC PERIPHERAL NEUROPATHY (HCC): ICD-10-CM

## 2020-07-21 DIAGNOSIS — G62.9 PERIPHERAL POLYNEUROPATHY: ICD-10-CM

## 2020-11-04 RX ORDER — OMEPRAZOLE 40 MG/1
CAPSULE, DELAYED RELEASE ORAL
Qty: 30 CAPSULE | Refills: 0 | Status: SHIPPED | OUTPATIENT
Start: 2020-11-04 | End: 2023-03-02

## 2020-11-16 ENCOUNTER — TELEPHONE (OUTPATIENT)
Dept: ENDOCRINOLOGY | Age: 50
End: 2020-11-16

## 2022-02-17 ENCOUNTER — OFFICE VISIT (OUTPATIENT)
Dept: PODIATRY | Facility: CLINIC | Age: 52
End: 2022-02-17

## 2022-02-17 VITALS
OXYGEN SATURATION: 98 % | HEART RATE: 88 BPM | SYSTOLIC BLOOD PRESSURE: 119 MMHG | WEIGHT: 263.8 LBS | DIASTOLIC BLOOD PRESSURE: 62 MMHG | HEIGHT: 77 IN | TEMPERATURE: 97.1 F | BODY MASS INDEX: 31.15 KG/M2

## 2022-02-17 DIAGNOSIS — E11.9 TYPE 2 DIABETES MELLITUS WITHOUT COMPLICATION, WITH LONG-TERM CURRENT USE OF INSULIN: Primary | ICD-10-CM

## 2022-02-17 DIAGNOSIS — Z79.4 TYPE 2 DIABETES MELLITUS WITHOUT COMPLICATION, WITH LONG-TERM CURRENT USE OF INSULIN: Primary | ICD-10-CM

## 2022-02-17 PROCEDURE — G8404 LOW EXTEMITY NEUR EXAM DOCUM: HCPCS | Performed by: PODIATRIST

## 2022-02-17 PROCEDURE — 99203 OFFICE O/P NEW LOW 30 MIN: CPT | Performed by: PODIATRIST

## 2022-02-17 RX ORDER — PIOGLITAZONEHYDROCHLORIDE 30 MG/1
TABLET ORAL
COMMUNITY
Start: 2022-02-12

## 2022-02-17 NOTE — PROGRESS NOTES
[facility] - PODIATRY    Today's Date: 02/17/22    Patient Name: Robert Youssef  MRN: [id number]  CSN: [id number]  PCP: Uvaldo Marmolejo MD, Last PCP Visit: 1 February 2022  Referring Provider: No ref. provider found    SUBJECTIVE     Chief Complaint   Patient presents with   • Left Foot - Callouses     HPI: Robert Youssef, a 51 y.o.male, presents to clinic for a diabetic foot evaluation.    New, Established, New Problem: New    Onset: Insidious    Nature: IDDM    Stable, worsening, improving: Improving    Patient controlling diabetes via: New, Established, New Problem:  new   Location:  bilateral feet  Duration:    Onset:  gradual  Nature:  IDDM  Stable, worsening, improving:  stable  Aggravating factors:  Previous Treatment:  insulin    Pt states their most recent blood glucose reading was 141.      Patient denies any fevers, chills, nausea, vomiting, shortness of breath, nor any other constitutional signs nor symptoms.    Patient relates he has numbness in his upper left thigh due to lumbar issues.    Past Medical History:   Diagnosis Date   • Hyperlipidemia    • Hypertension    • Type 2 diabetes mellitus (HCC)      History reviewed. No pertinent surgical history.  Family History   Problem Relation Age of Onset   • Diabetes Mother    • Cancer Maternal Grandmother    • Cancer Maternal Grandfather    • Cancer Paternal Grandmother    • Cancer Paternal Grandfather    • Heart disease Neg Hx    • Stroke Neg Hx      Social History     Socioeconomic History   • Marital status:    Tobacco Use   • Smoking status: Never Smoker   • Smokeless tobacco: Never Used   Vaping Use   • Vaping Use: Never used   Substance and Sexual Activity   • Alcohol use: Never   • Drug use: Never   • Sexual activity: Defer     Allergies   Allergen Reactions   • Tetanus Toxoid      Current Outpatient Medications   Medication Sig Dispense Refill   • glimepiride (AMARYL) 4 MG tablet Take 2 tablets by mouth Every Morning  Before Breakfast. 180 tablet 4   • glucose blood test strip Use to test BG 1 time daily. Manjit contour test strips 100 each 4   • Insulin Glargine, 1 Unit Dial, (Toujeo SoloStar) 300 UNIT/ML solution pen-injector injection Inject up to 80 units daily 24 mL 0   • Insulin Pen Needle (BD Pen Needle Ronda U/F) 32G X 4 MM misc Use to inject insulin once daily 100 each 4   • lisinopril (PRINIVIL,ZESTRIL) 10 MG tablet Take 1 tablet by mouth Daily. 90 tablet 4   • omeprazole (priLOSEC) 40 MG capsule TAKE ONE CAPSULE BY MOUTH DAILY 30 capsule 0   • pioglitazone (ACTOS) 30 MG tablet      • pravastatin (PRAVACHOL) 40 MG tablet Take 1 tablet by mouth Daily. 90 tablet 4     No current facility-administered medications for this visit.     Review of Systems   Constitutional: Negative.    All other systems reviewed and are negative.      OBJECTIVE     Vitals:    02/17/22 0839   BP: 119/62   Pulse: 88   Temp: 97.1 °F (36.2 °C)   SpO2: 98%       Body mass index is 31.28 kg/m².    Lab Results   Component Value Date    HGBA1C 8.20 (H) 10/30/2019       Lab Results   Component Value Date    GLUCOSE 212 (H) 10/30/2019    CALCIUM 9.1 10/30/2019     10/30/2019    K 4.8 10/30/2019    CO2 27.0 10/30/2019    CL 97 (L) 10/30/2019    BUN 11 10/30/2019    CREATININE 1.19 10/30/2019    EGFRIFAFRI 79 10/30/2019    EGFRIFNONA 65 10/30/2019    BCR 9.2 10/30/2019       Patient seen in no apparent distress.      PHYSICAL EXAM:     Foot/Ankle Exam:       General:   Diabetic Foot Exam Performed    Appearance: appears stated age and healthy    Orientation: AAOx3    Affect: appropriate    Gait: unimpaired    Shoe Gear:  Casual shoes    VASCULAR      Right Foot Vascularity   Normal vascular exam    Dorsalis pedis:  2+  Posterior tibial:  2+  Skin Temperature: warm    Edema Grading:  None  CFT:  < 3 seconds  Pedal Hair Growth:  Present  Varicosities: none       Left Foot Vascularity   Normal vascular exam    Dorsalis pedis:  2+  Posterior tibial:   2+  Skin Temperature: warm    Edema Grading:  None  CFT:  < 3 seconds  Pedal Hair Growth:  Present  Varicosities: none        NEUROLOGIC     Right Foot Neurologic   Normal sensation    Light touch sensation:  Normal  Vibratory sensation:  Normal  Hot/Cold sensation: normal    Protective Sensation using Dewey-Sophia Monofilament:  10     Left Foot Neurologic   Normal sensation    Light touch sensation:  Normal  Vibratory sensation:  Normal  Hot/cold sensation: normal    Protective Sensation using Dewey-Sophia Monofilament:  10     MUSCULOSKELETAL      Right Foot Musculoskeletal   Hammertoe:  First toe, second toe, third toe, fourth toe and fifth toe     Left Foot Musculoskeletal   Hammertoe:  Fifth toe, fourth toe, third toe, second toe and first toe     MUSCLE STRENGTH     Right Foot Muscle Strength   Normal strength    Foot dorsiflexion:  5  Foot plantar flexion:  5  Foot inversion:  5  Foot eversion:  5     Left Foot Muscle Strength   Foot dorsiflexion:  5  Foot plantar flexion:  5  Foot inversion:  5  Foot eversion:  5     RANGE OF MOTION      Right Foot Range of Motion   Foot and ankle ROM within normal limits       Left Foot Range of Motion   Foot and ankle ROM within normal limits       DERMATOLOGIC     Right Foot Dermatologic   Skin: skin intact    Nails: normal       Left Foot Dermatologic   Skin: skin intact    Nails: normal        Diabetic Foot Exam Performed      ASSESSMENT/PLAN     Diagnoses and all orders for this visit:    1. Type 2 diabetes mellitus without complication, with long-term current use of insulin (HCC) (Primary)        Comprehensive lower extremity examination and evaluation was performed.    Discussed findings and treatment plan including risks, benefits, and treatment options with patient in detail. Patient agreed with treatment plan.    Medications and allergies reviewed.  Reviewed available blood glucose and HgB A1C lab values along with other pertinent labs.  These were  discussed with the patient as to their importance of diabetic maintenance.    Diabetic foot exam performed and documented this date, compliant with CQM required standards. Detail of findings as noted in physical exam.  Lower extremity Neurologic exam for diabetic patient performed and documented this date, compliant with PQRS required standards. Detail of findings as noted in physical exam.  Advised patient importance of good routine lower extremity hygiene. Advised patient importance of evaluating for intact skin and pain free nail borders.  Advised patient to use mirror to evaluate plantar/ soles of feet for better visualization. Advised patient monitor and phone office to be seen if any cracking to skin, open lesions, painful nail borders or if nails become elongated prior to next visit. Advised patient importance of daily cleansing of lower extremities, followed by good skin cream to maintain normal hydration of skin. Also advised patient importance of close daily monitoring of blood sugar. Advised to regulate diet and medications to maintain control of blood sugar in optimal range. Contact primary care provider if difficulties maintaining blood sugar levels.  Advised Patient of presence of Diabetes Mellitus condition.  Advised Patient risk of progression and worsening or improvement, then return of condition.  Will monitor condition for any change in future. Treat with most appropriate treatment pending status of condition.  Counseled and advised patient extensively on nature and ramifications of diabetes. Standard instructions given to patient for good diabetic foot care and maintenance. Advised importance of careful monitoring to avoid break down and complications secondary to diabetes. Advised patient importance of strict maintenance of blood sugar control. Advised patient of possible ominous results from neglect of condition, i.e.: amputation/ loss of digits, feet and legs, or even death.  Patient states  understands counseling, will monitor closely, continue good hygiene and routine diabetic foot care. Patient will contact office is questions or problems.      An After Visit Summary was printed and given to the patient at discharge, including (if requested) any available informative/educational handouts regarding diagnosis, treatment, or medications. All questions were answered to patient/family satisfaction. Should symptoms fail to improve or worsen they agree to call or return to clinic or to go to the Emergency Department. Discussed the importance of following up with any needed screening tests/labs/specialist appointments and any requested follow-up recommended by me today. Importance of maintaining follow-up discussed and patient accepts that missed appointments can delay diagnosis and potentially lead to worsening of conditions.    Return in about 1 year (around 2/17/2023) for Podiatry Diabetic Foot Exam., or sooner if acute issues arise.    This document has been electronically signed by Yoan Schroeder DPM on February 17, 2022 09:00 EST

## 2023-03-02 ENCOUNTER — OFFICE VISIT (OUTPATIENT)
Dept: PODIATRY | Facility: CLINIC | Age: 53
End: 2023-03-02
Payer: COMMERCIAL

## 2023-03-02 VITALS
OXYGEN SATURATION: 99 % | BODY MASS INDEX: 30.46 KG/M2 | HEART RATE: 92 BPM | DIASTOLIC BLOOD PRESSURE: 63 MMHG | SYSTOLIC BLOOD PRESSURE: 113 MMHG | TEMPERATURE: 98 F | WEIGHT: 258 LBS | HEIGHT: 77 IN

## 2023-03-02 DIAGNOSIS — E11.8 DIABETIC FOOT: ICD-10-CM

## 2023-03-02 DIAGNOSIS — E11.9 TYPE 2 DIABETES MELLITUS WITHOUT COMPLICATION, WITH LONG-TERM CURRENT USE OF INSULIN: Primary | ICD-10-CM

## 2023-03-02 DIAGNOSIS — Z79.4 TYPE 2 DIABETES MELLITUS WITHOUT COMPLICATION, WITH LONG-TERM CURRENT USE OF INSULIN: Primary | ICD-10-CM

## 2023-03-02 PROCEDURE — 99213 OFFICE O/P EST LOW 20 MIN: CPT | Performed by: PODIATRIST

## 2023-03-02 PROCEDURE — G8404 LOW EXTEMITY NEUR EXAM DOCUM: HCPCS | Performed by: PODIATRIST

## 2023-03-02 RX ORDER — TRAZODONE HYDROCHLORIDE 50 MG/1
TABLET ORAL
COMMUNITY

## 2023-03-02 RX ORDER — ALBUTEROL SULFATE 90 UG/1
2 AEROSOL, METERED RESPIRATORY (INHALATION) EVERY 4 HOURS PRN
COMMUNITY
Start: 2022-12-26

## 2023-03-02 RX ORDER — AZELASTINE 1 MG/ML
SPRAY, METERED NASAL
COMMUNITY
Start: 2023-02-13

## 2023-03-02 RX ORDER — INSULIN DEGLUDEC 200 U/ML
INJECTION, SOLUTION SUBCUTANEOUS
COMMUNITY

## 2023-03-02 RX ORDER — PANTOPRAZOLE SODIUM 40 MG/1
TABLET, DELAYED RELEASE ORAL
COMMUNITY

## 2023-03-02 RX ORDER — AMITRIPTYLINE HYDROCHLORIDE 25 MG/1
TABLET, FILM COATED ORAL
COMMUNITY
Start: 2023-02-27 | End: 2023-03-02

## 2023-03-02 RX ORDER — TRIAMCINOLONE ACETONIDE 1 MG/G
CREAM TOPICAL
COMMUNITY

## 2023-03-02 RX ORDER — LANCETS
EACH MISCELLANEOUS 3 TIMES DAILY
COMMUNITY
Start: 2022-12-27

## 2023-03-02 RX ORDER — TIRZEPATIDE 10 MG/.5ML
INJECTION, SOLUTION SUBCUTANEOUS
COMMUNITY
Start: 2023-02-20

## 2023-03-02 NOTE — PROGRESS NOTES
[facility] - PODIATRY    Today's Date: 03/02/23    Patient Name: Robert Youssef  MRN: [id number]  CSN: [id number]  PCP: Uvaldo Marmolejo MD, Last PCP Visit: 2/2/2023  Referring Provider: No ref. provider found    SUBJECTIVE     Chief Complaint   Patient presents with   • Left Foot - Follow-up, Annual Exam, Callouses, Diabetes   • Right Foot - Follow-up, Diabetes, Annual Exam     HPI: Robert Youssef, a 52 y.o.male, presents to clinic for a diabetic foot evaluation.    New, Established, New Problem: Established    Onset: Insidious    Nature: IDDM    Stable, worsening, improving: Improving    Previous Treatment:  insulin    States his last hemoglobin A1c was below 7.  Patient states he had a weight loss of proximately 40 pounds due to starting Mounjaro shots weekly.    Patient denies any fevers, chills, nausea, vomiting, shortness of breath, nor any other constitutional signs nor symptoms.    Patient relates he has numbness in his upper left thigh due to lumbar issues.    Past Medical History:   Diagnosis Date   • Callus    • Hyperlipidemia    • Hypertension    • Neuropathy in diabetes (HCC)    • Type 2 diabetes mellitus (HCC)      History reviewed. No pertinent surgical history.  Family History   Problem Relation Age of Onset   • Diabetes Mother    • Cancer Maternal Grandmother    • Cancer Maternal Grandfather    • Cancer Paternal Grandmother    • Cancer Paternal Grandfather    • Diabetes Father    • Heart disease Neg Hx    • Stroke Neg Hx      Social History     Socioeconomic History   • Marital status:    Tobacco Use   • Smoking status: Never   • Smokeless tobacco: Never   Vaping Use   • Vaping Use: Never used   Substance and Sexual Activity   • Alcohol use: Never   • Drug use: Never   • Sexual activity: Yes     Partners: Female     Allergies   Allergen Reactions   • Tetanus Toxoid      Current Outpatient Medications   Medication Sig Dispense Refill   • Accu-Chek FastClix Lancets mis 3  (Three) Times a Day.     • albuterol sulfate  (90 Base) MCG/ACT inhaler Inhale 2 puffs Every 4 (Four) Hours As Needed.     • azelastine (ASTELIN) 0.1 % nasal spray USE 1 TO 2 SPRAYS IN EACH NOSTRIL TWICE DAILY AS NEEDED     • glucose blood test strip Use to test BG 1 time daily. Manjit contour test strips 100 each 4   • Insulin Degludec (Tresiba FlexTouch) 200 UNIT/ML solution pen-injector pen injection Tresiba FlexTouch U-200 insulin 200 unit/mL (3 mL) subcutaneous pen   ADMINISTER 80 UNITS UNDER THE SKIN DAILY     • Insulin Pen Needle (BD Pen Needle Ronda U/F) 32G X 4 MM misc Use to inject insulin once daily 100 each 4   • lisinopril (PRINIVIL,ZESTRIL) 10 MG tablet Take 1 tablet by mouth Daily. 90 tablet 4   • Mounjaro 10 MG/0.5ML solution pen-injector INJECT 10 MG UNDER THE SKIN EVERY WEEK     • pantoprazole (PROTONIX) 40 MG EC tablet pantoprazole 40 mg tablet,delayed release     • pioglitazone (ACTOS) 30 MG tablet      • pravastatin (PRAVACHOL) 40 MG tablet Take 1 tablet by mouth Daily. 90 tablet 4   • traZODone (DESYREL) 50 MG tablet trazodone 50 mg tablet     • triamcinolone (KENALOG) 0.1 % cream triamcinolone acetonide 0.1 % topical cream     • glimepiride (AMARYL) 4 MG tablet Take 2 tablets by mouth Every Morning Before Breakfast. 180 tablet 4     No current facility-administered medications for this visit.     Review of Systems   Constitutional: Negative.    All other systems reviewed and are negative.      OBJECTIVE     Vitals:    03/02/23 0757   BP: 113/63   Pulse: 92   Temp: 98 °F (36.7 °C)   SpO2: 99%       Body mass index is 30.78 kg/m².    Lab Results   Component Value Date    HGBA1C 8.20 (H) 10/30/2019       Lab Results   Component Value Date    GLUCOSE 212 (H) 10/30/2019    CALCIUM 9.1 10/30/2019     10/30/2019    K 4.8 10/30/2019    CO2 27.0 10/30/2019    CL 97 (L) 10/30/2019    BUN 11 10/30/2019    CREATININE 1.19 10/30/2019    EGFRIFAFRI 79 10/30/2019    EGFRIFNONA 65 10/30/2019    BCR  9.2 10/30/2019       Patient seen in no apparent distress.      PHYSICAL EXAM:     Foot/Ankle Exam:       General:   Diabetic Foot Exam Performed    Appearance: appears stated age and healthy    Orientation: AAOx3    Affect: appropriate    Gait: unimpaired    Shoe Gear:  Casual shoes    VASCULAR      Right Foot Vascularity   Normal vascular exam    Dorsalis pedis:  2+  Posterior tibial:  2+  Skin Temperature: warm    Edema Grading:  None  CFT:  < 3 seconds  Pedal Hair Growth:  Present  Varicosities: none       Left Foot Vascularity   Normal vascular exam    Dorsalis pedis:  2+  Posterior tibial:  2+  Skin Temperature: warm    Edema Grading:  None  CFT:  < 3 seconds  Pedal Hair Growth:  Present  Varicosities: none        NEUROLOGIC     Right Foot Neurologic   Normal sensation    Light touch sensation:  Normal  Vibratory sensation:  Normal  Hot/Cold sensation: normal    Protective Sensation using Sibley-Sophia Monofilament:  10     Left Foot Neurologic   Normal sensation    Light touch sensation:  Normal  Vibratory sensation:  Normal  Hot/cold sensation: normal    Protective Sensation using Sibley-Sophia Monofilament:  10     MUSCULOSKELETAL      Right Foot Musculoskeletal   Hammertoe:  First toe, second toe, third toe, fourth toe and fifth toe     Left Foot Musculoskeletal   Hammertoe:  Fifth toe, fourth toe, third toe, second toe and first toe     MUSCLE STRENGTH     Right Foot Muscle Strength   Foot dorsiflexion:  4  Foot plantar flexion:  4  Foot inversion:  4  Foot eversion:  4     Left Foot Muscle Strength   Foot dorsiflexion:  4  Foot plantar flexion:  4  Foot inversion:  4  Foot eversion:  4     RANGE OF MOTION      Right Foot Range of Motion   Foot and ankle ROM within normal limits       Left Foot Range of Motion   Foot and ankle ROM within normal limits       DERMATOLOGIC     Right Foot Dermatologic   Skin: skin intact    Nails: normal       Left Foot Dermatologic   Skin: skin intact    Nails: normal         Diabetic Foot Exam Performed      ASSESSMENT/PLAN     Diagnoses and all orders for this visit:    1. Type 2 diabetes mellitus without complication, with long-term current use of insulin (HCC) (Primary)    2. Diabetic foot ([facility])        Comprehensive lower extremity examination and evaluation was performed.    Discussed findings and treatment plan including risks, benefits, and treatment options with patient in detail. Patient agreed with treatment plan.    Medications and allergies reviewed.  Reviewed available blood glucose and HgB A1C lab values along with other pertinent labs.  These were discussed with the patient as to their importance of diabetic maintenance.    Diabetic foot exam performed and documented this date, compliant with CQM required standards. Detail of findings as noted in physical exam.  Lower extremity Neurologic exam for diabetic patient performed and documented this date, compliant with PQRS required standards. Detail of findings as noted in physical exam.  Advised patient importance of good routine lower extremity hygiene. Advised patient importance of evaluating for intact skin and pain free nail borders.  Advised patient to use mirror to evaluate plantar/ soles of feet for better visualization. Advised patient monitor and phone office to be seen if any cracking to skin, open lesions, painful nail borders or if nails become elongated prior to next visit. Advised patient importance of daily cleansing of lower extremities, followed by good skin cream to maintain normal hydration of skin. Also advised patient importance of close daily monitoring of blood sugar. Advised to regulate diet and medications to maintain control of blood sugar in optimal range. Contact primary care provider if difficulties maintaining blood sugar levels.  Advised Patient of presence of Diabetes Mellitus condition.  Advised Patient risk of progression and worsening or improvement, then return of condition.  Will  monitor condition for any change in future. Treat with most appropriate treatment pending status of condition.  Counseled and advised patient extensively on nature and ramifications of diabetes. Standard instructions given to patient for good diabetic foot care and maintenance. Advised importance of careful monitoring to avoid break down and complications secondary to diabetes. Advised patient importance of strict maintenance of blood sugar control. Advised patient of possible ominous results from neglect of condition, i.e.: amputation/ loss of digits, feet and legs, or even death.  Patient states understands counseling, will monitor closely, continue good hygiene and routine diabetic foot care. Patient will contact office is questions or problems.      An After Visit Summary was printed and given to the patient at discharge, including (if requested) any available informative/educational handouts regarding diagnosis, treatment, or medications. All questions were answered to patient/family satisfaction. Should symptoms fail to improve or worsen they agree to call or return to clinic or to go to the Emergency Department. Discussed the importance of following up with any needed screening tests/labs/specialist appointments and any requested follow-up recommended by me today. Importance of maintaining follow-up discussed and patient accepts that missed appointments can delay diagnosis and potentially lead to worsening of conditions.    Return in about 1 year (around 3/2/2024) for Podiatry Diabetic Foot Exam., or sooner if acute issues arise.    This document has been electronically signed by Yoan Schroeder DPM on March 2, 2023 08:21 EST